# Patient Record
Sex: MALE | Race: WHITE | Employment: FULL TIME | URBAN - METROPOLITAN AREA
[De-identification: names, ages, dates, MRNs, and addresses within clinical notes are randomized per-mention and may not be internally consistent; named-entity substitution may affect disease eponyms.]

---

## 2019-12-17 ENCOUNTER — HOSPITAL ENCOUNTER (OUTPATIENT)
Age: 34
Discharge: HOME | End: 2019-12-19
Payer: COMMERCIAL

## 2019-12-17 ENCOUNTER — OFFICE VISIT (OUTPATIENT)
Dept: FAMILY MEDICINE CLINIC | Age: 34
End: 2019-12-17
Payer: COMMERCIAL

## 2019-12-17 VITALS
OXYGEN SATURATION: 97 % | SYSTOLIC BLOOD PRESSURE: 124 MMHG | HEART RATE: 110 BPM | BODY MASS INDEX: 30.48 KG/M2 | WEIGHT: 225 LBS | HEIGHT: 72 IN | DIASTOLIC BLOOD PRESSURE: 80 MMHG | RESPIRATION RATE: 18 BRPM

## 2019-12-17 DIAGNOSIS — Z22.322 METHICILLIN RESISTANT STAPHYLOCOCCUS AUREUS COLONIZATION: ICD-10-CM

## 2019-12-17 DIAGNOSIS — L01.00 IMPETIGO: Primary | ICD-10-CM

## 2019-12-17 DIAGNOSIS — I10 BENIGN ESSENTIAL HTN: ICD-10-CM

## 2019-12-17 DIAGNOSIS — L02.92 BOILS: ICD-10-CM

## 2019-12-17 DIAGNOSIS — E03.9 ACQUIRED HYPOTHYROIDISM: ICD-10-CM

## 2019-12-17 DIAGNOSIS — E78.2 MIXED HYPERLIPIDEMIA: ICD-10-CM

## 2019-12-17 DIAGNOSIS — J30.2 SEASONAL ALLERGIC RHINITIS, UNSPECIFIED TRIGGER: ICD-10-CM

## 2019-12-17 DIAGNOSIS — R73.01 IFG (IMPAIRED FASTING GLUCOSE): ICD-10-CM

## 2019-12-17 DIAGNOSIS — B07.9 VIRAL WARTS, UNSPECIFIED TYPE: ICD-10-CM

## 2019-12-17 DIAGNOSIS — L01.00 IMPETIGO: ICD-10-CM

## 2019-12-17 LAB
ALBUMIN SERPL-MCNC: 5.1 G/DL (ref 3.5–5.2)
ALP BLD-CCNC: 54 U/L (ref 40–129)
ALT SERPL-CCNC: 15 U/L (ref 0–40)
ANION GAP SERPL CALCULATED.3IONS-SCNC: 18 MMOL/L (ref 7–16)
AST SERPL-CCNC: 23 U/L (ref 0–39)
BASOPHILS ABSOLUTE: 0.06 E9/L (ref 0–0.2)
BASOPHILS RELATIVE PERCENT: 0.8 % (ref 0–2)
BILIRUB SERPL-MCNC: 0.4 MG/DL (ref 0–1.2)
BUN BLDV-MCNC: 7 MG/DL (ref 6–20)
CALCIUM SERPL-MCNC: 10.1 MG/DL (ref 8.6–10.2)
CHLORIDE BLD-SCNC: 104 MMOL/L (ref 98–107)
CHOLESTEROL, TOTAL: 197 MG/DL (ref 0–199)
CO2: 22 MMOL/L (ref 22–29)
CREAT SERPL-MCNC: 0.8 MG/DL (ref 0.7–1.2)
EOSINOPHILS ABSOLUTE: 0.19 E9/L (ref 0.05–0.5)
EOSINOPHILS RELATIVE PERCENT: 2.5 % (ref 0–6)
GFR AFRICAN AMERICAN: >60
GFR NON-AFRICAN AMERICAN: >60 ML/MIN/1.73
GLUCOSE BLD-MCNC: 107 MG/DL (ref 74–99)
HBA1C MFR BLD: 5.4 % (ref 4–5.6)
HCT VFR BLD CALC: 46.3 % (ref 37–54)
HDLC SERPL-MCNC: 54 MG/DL
HEMOGLOBIN: 14.8 G/DL (ref 12.5–16.5)
IMMATURE GRANULOCYTES #: 0.03 E9/L
IMMATURE GRANULOCYTES %: 0.4 % (ref 0–5)
LDL CHOLESTEROL CALCULATED: 119 MG/DL (ref 0–99)
LYMPHOCYTES ABSOLUTE: 1.79 E9/L (ref 1.5–4)
LYMPHOCYTES RELATIVE PERCENT: 23.6 % (ref 20–42)
MCH RBC QN AUTO: 28.4 PG (ref 26–35)
MCHC RBC AUTO-ENTMCNC: 32 % (ref 32–34.5)
MCV RBC AUTO: 88.9 FL (ref 80–99.9)
MONOCYTES ABSOLUTE: 0.68 E9/L (ref 0.1–0.95)
MONOCYTES RELATIVE PERCENT: 9 % (ref 2–12)
NEUTROPHILS ABSOLUTE: 4.83 E9/L (ref 1.8–7.3)
NEUTROPHILS RELATIVE PERCENT: 63.7 % (ref 43–80)
PDW BLD-RTO: 13.2 FL (ref 11.5–15)
PLATELET # BLD: 219 E9/L (ref 130–450)
PMV BLD AUTO: 10.5 FL (ref 7–12)
POTASSIUM SERPL-SCNC: 4.5 MMOL/L (ref 3.5–5)
RBC # BLD: 5.21 E12/L (ref 3.8–5.8)
SODIUM BLD-SCNC: 144 MMOL/L (ref 132–146)
T4 FREE: 1.44 NG/DL (ref 0.93–1.7)
TOTAL PROTEIN: 8.1 G/DL (ref 6.4–8.3)
TRIGL SERPL-MCNC: 119 MG/DL (ref 0–149)
TSH SERPL DL<=0.05 MIU/L-ACNC: 1.46 UIU/ML (ref 0.27–4.2)
VLDLC SERPL CALC-MCNC: 24 MG/DL
WBC # BLD: 7.6 E9/L (ref 4.5–11.5)

## 2019-12-17 PROCEDURE — G8484 FLU IMMUNIZE NO ADMIN: HCPCS | Performed by: FAMILY MEDICINE

## 2019-12-17 PROCEDURE — G8417 CALC BMI ABV UP PARAM F/U: HCPCS | Performed by: FAMILY MEDICINE

## 2019-12-17 PROCEDURE — 4004F PT TOBACCO SCREEN RCVD TLK: CPT | Performed by: FAMILY MEDICINE

## 2019-12-17 PROCEDURE — 84443 ASSAY THYROID STIM HORMONE: CPT

## 2019-12-17 PROCEDURE — 84439 ASSAY OF FREE THYROXINE: CPT

## 2019-12-17 PROCEDURE — 36415 COLL VENOUS BLD VENIPUNCTURE: CPT

## 2019-12-17 PROCEDURE — G8427 DOCREV CUR MEDS BY ELIG CLIN: HCPCS | Performed by: FAMILY MEDICINE

## 2019-12-17 PROCEDURE — 80061 LIPID PANEL: CPT

## 2019-12-17 PROCEDURE — 85025 COMPLETE CBC W/AUTO DIFF WBC: CPT

## 2019-12-17 PROCEDURE — 99203 OFFICE O/P NEW LOW 30 MIN: CPT | Performed by: FAMILY MEDICINE

## 2019-12-17 PROCEDURE — 80053 COMPREHEN METABOLIC PANEL: CPT

## 2019-12-17 PROCEDURE — 83036 HEMOGLOBIN GLYCOSYLATED A1C: CPT

## 2019-12-17 RX ORDER — DOXYCYCLINE HYCLATE 100 MG
100 TABLET ORAL 2 TIMES DAILY WITH MEALS
Qty: 42 TABLET | Refills: 0 | Status: SHIPPED | OUTPATIENT
Start: 2019-12-17 | End: 2020-01-07

## 2019-12-17 RX ORDER — LISINOPRIL 10 MG/1
10 TABLET ORAL DAILY
Qty: 90 TABLET | Refills: 5 | Status: SHIPPED
Start: 2019-12-17 | End: 2021-02-08 | Stop reason: SDUPTHER

## 2019-12-17 RX ORDER — TERAZOSIN 2 MG/1
2 CAPSULE ORAL NIGHTLY
COMMUNITY
End: 2019-12-17 | Stop reason: SDUPTHER

## 2019-12-17 RX ORDER — LEVOTHYROXINE SODIUM 0.05 MG/1
50 TABLET ORAL DAILY
COMMUNITY
End: 2019-12-17 | Stop reason: SDUPTHER

## 2019-12-17 RX ORDER — LEVOTHYROXINE SODIUM 0.05 MG/1
50 TABLET ORAL DAILY
Qty: 90 TABLET | Refills: 5 | Status: SHIPPED
Start: 2019-12-17 | End: 2021-02-16 | Stop reason: SDUPTHER

## 2019-12-17 RX ORDER — IPRATROPIUM BROMIDE 42 UG/1
2 SPRAY, METERED NASAL 3 TIMES DAILY
Qty: 1 BOTTLE | Refills: 5 | Status: SHIPPED
Start: 2019-12-17 | End: 2020-06-07 | Stop reason: ALTCHOICE

## 2019-12-17 RX ORDER — MONTELUKAST SODIUM 10 MG/1
10 TABLET ORAL DAILY
Qty: 30 TABLET | Refills: 5 | Status: SHIPPED
Start: 2019-12-17 | End: 2021-02-16 | Stop reason: SDUPTHER

## 2019-12-17 RX ORDER — TERAZOSIN 2 MG/1
2 CAPSULE ORAL NIGHTLY
Qty: 90 CAPSULE | Refills: 5 | Status: SHIPPED
Start: 2019-12-17 | End: 2020-03-12

## 2019-12-17 ASSESSMENT — PATIENT HEALTH QUESTIONNAIRE - PHQ9
1. LITTLE INTEREST OR PLEASURE IN DOING THINGS: 0
SUM OF ALL RESPONSES TO PHQ QUESTIONS 1-9: 0
SUM OF ALL RESPONSES TO PHQ9 QUESTIONS 1 & 2: 0
2. FEELING DOWN, DEPRESSED OR HOPELESS: 0
SUM OF ALL RESPONSES TO PHQ QUESTIONS 1-9: 0

## 2019-12-18 ASSESSMENT — ENCOUNTER SYMPTOMS
SINUS PAIN: 0
SHORTNESS OF BREATH: 0
APNEA: 0
EYE DISCHARGE: 0
CHEST TIGHTNESS: 0
TROUBLE SWALLOWING: 0
VOMITING: 0
SINUS PRESSURE: 0
EYE ITCHING: 0
WHEEZING: 0
PHOTOPHOBIA: 0
RHINORRHEA: 1
NAUSEA: 0
DIARRHEA: 0
SINUS COMPLAINT: 1
ABDOMINAL DISTENTION: 0
CHOKING: 0
CONSTIPATION: 0
VOICE CHANGE: 0
ANAL BLEEDING: 0
COUGH: 0
BLOOD IN STOOL: 0
EYE PAIN: 0
COLOR CHANGE: 0
RECTAL PAIN: 0
EYE REDNESS: 0
FACIAL SWELLING: 0
STRIDOR: 0
SORE THROAT: 0
ABDOMINAL PAIN: 0
BACK PAIN: 0

## 2020-03-12 ENCOUNTER — HOSPITAL ENCOUNTER (OUTPATIENT)
Age: 35
Discharge: HOME | End: 2020-03-14
Payer: COMMERCIAL

## 2020-03-12 ENCOUNTER — OFFICE VISIT (OUTPATIENT)
Dept: FAMILY MEDICINE CLINIC | Age: 35
End: 2020-03-12
Payer: COMMERCIAL

## 2020-03-12 VITALS
DIASTOLIC BLOOD PRESSURE: 74 MMHG | RESPIRATION RATE: 20 BRPM | BODY MASS INDEX: 32.21 KG/M2 | HEART RATE: 80 BPM | TEMPERATURE: 98.4 F | WEIGHT: 225 LBS | SYSTOLIC BLOOD PRESSURE: 124 MMHG | OXYGEN SATURATION: 98 % | HEIGHT: 70 IN

## 2020-03-12 LAB — RHEUMATOID FACTOR: 17 IU/ML (ref 0–13)

## 2020-03-12 PROCEDURE — G8417 CALC BMI ABV UP PARAM F/U: HCPCS | Performed by: FAMILY MEDICINE

## 2020-03-12 PROCEDURE — 4004F PT TOBACCO SCREEN RCVD TLK: CPT | Performed by: FAMILY MEDICINE

## 2020-03-12 PROCEDURE — 86431 RHEUMATOID FACTOR QUANT: CPT

## 2020-03-12 PROCEDURE — 36415 COLL VENOUS BLD VENIPUNCTURE: CPT

## 2020-03-12 PROCEDURE — 84403 ASSAY OF TOTAL TESTOSTERONE: CPT

## 2020-03-12 PROCEDURE — G8427 DOCREV CUR MEDS BY ELIG CLIN: HCPCS | Performed by: FAMILY MEDICINE

## 2020-03-12 PROCEDURE — 99213 OFFICE O/P EST LOW 20 MIN: CPT | Performed by: FAMILY MEDICINE

## 2020-03-12 PROCEDURE — 86200 CCP ANTIBODY: CPT

## 2020-03-12 PROCEDURE — G8484 FLU IMMUNIZE NO ADMIN: HCPCS | Performed by: FAMILY MEDICINE

## 2020-03-12 RX ORDER — SILDENAFIL CITRATE 20 MG/1
20 TABLET ORAL PRN
Qty: 30 TABLET | Refills: 5 | Status: SHIPPED
Start: 2020-03-12 | End: 2021-02-08 | Stop reason: SDUPTHER

## 2020-03-12 RX ORDER — SULFAMETHOXAZOLE AND TRIMETHOPRIM 800; 160 MG/1; MG/1
TABLET ORAL
COMMUNITY
Start: 2020-03-11 | End: 2020-06-07 | Stop reason: ALTCHOICE

## 2020-03-12 RX ORDER — ASPIRIN 81 MG
5 TABLET, DELAYED RELEASE (ENTERIC COATED) ORAL 4 TIMES DAILY PRN
Qty: 1 BOTTLE | Refills: 0 | Status: SHIPPED
Start: 2020-03-12 | End: 2020-06-07 | Stop reason: ALTCHOICE

## 2020-03-12 ASSESSMENT — PATIENT HEALTH QUESTIONNAIRE - PHQ9
2. FEELING DOWN, DEPRESSED OR HOPELESS: 0
SUM OF ALL RESPONSES TO PHQ9 QUESTIONS 1 & 2: 0
SUM OF ALL RESPONSES TO PHQ QUESTIONS 1-9: 0
1. LITTLE INTEREST OR PLEASURE IN DOING THINGS: 0
SUM OF ALL RESPONSES TO PHQ QUESTIONS 1-9: 0

## 2020-03-13 ENCOUNTER — TELEPHONE (OUTPATIENT)
Dept: FAMILY MEDICINE CLINIC | Age: 35
End: 2020-03-13

## 2020-03-13 LAB — TESTOSTERONE TOTAL: 342.6 NG/DL

## 2020-03-13 ASSESSMENT — ENCOUNTER SYMPTOMS
COLOR CHANGE: 0
ABDOMINAL DISTENTION: 0
BACK PAIN: 0
SORE THROAT: 0
DIARRHEA: 0
APNEA: 0
RHINORRHEA: 0
SINUS PRESSURE: 0
VOMITING: 0
SHORTNESS OF BREATH: 0
EYE ITCHING: 0
CHEST TIGHTNESS: 0
VOICE CHANGE: 0
FACIAL SWELLING: 0
COUGH: 0
EYE PAIN: 0
BLOOD IN STOOL: 0
STRIDOR: 0
WHEEZING: 0
EYE REDNESS: 0
EYE DISCHARGE: 0
ANAL BLEEDING: 0
TROUBLE SWALLOWING: 0
NAUSEA: 0
PHOTOPHOBIA: 0
CHOKING: 0
ABDOMINAL PAIN: 0
CONSTIPATION: 0
RECTAL PAIN: 0

## 2020-03-13 NOTE — PROGRESS NOTES
Ottoniel Olvera is a 30 y.o. male  . Subjective:      Otalgia    There is pain in both (ear fullness and muffled hearing ) ears. This is a recurrent problem. The current episode started more than 1 month ago. The problem occurs constantly. The problem has been waxing and waning. There has been no fever. The patient is experiencing no pain. Pertinent negatives include no abdominal pain, coughing, diarrhea, ear discharge, headaches, hearing loss, neck pain, rash, rhinorrhea, sore throat or vomiting. He has tried nothing for the symptoms. The treatment provided no relief. His past medical history is significant for hearing loss. Hand Pain    The incident occurred more than 1 week ago (bilateral hand pain, stiffiness in the AM). The injury mechanism was repetitive motion. The pain is present in the left hand and right hand. The quality of the pain is described as aching and cramping. The pain does not radiate. The pain is at a severity of 4/10. The pain is moderate. The pain has been intermittent since the incident. Pertinent negatives include no chest pain or numbness. Nothing aggravates the symptoms. He has tried nothing for the symptoms. The treatment provided no relief. Erectile Dysfunction   This is a recurrent problem. The current episode started more than 1 year ago. The problem has been waxing and waning since onset. The nature of his difficulty is maintaining erection. He reports no anxiety. He reports his erection duration to be 1 to 5 minutes. Irritative symptoms include frequency and urgency. Irritative symptoms do not include nocturia. Obstructive symptoms include incomplete emptying, a slower stream, straining and a weak stream. Pertinent negatives include no chills, dysuria or hematuria. Nothing aggravates the symptoms. Treatments tried: hytrin. The treatment provided no relief.        Review of Systems   Constitutional: Negative for activity change, appetite change, chills, diaphoresis, fatigue, fever 10 MG tablet Take 1 tablet by mouth daily 90 tablet 5    montelukast (SINGULAIR) 10 MG tablet Take 1 tablet by mouth daily 30 tablet 5    ipratropium (ATROVENT) 0.06 % nasal spray 2 sprays by Nasal route 3 times daily 1 Bottle 5     No current facility-administered medications on file prior to visit. Allergies   Allergen Reactions    Ativan [Lorazepam] Other (See Comments)     confusion       I have reviewed his allergies, medications, problem list, medical,social and family history and have updated as needed in the electronic medical record. Objective:     Physical Exam  Vitals signs and nursing note reviewed. Constitutional:       General: He is not in acute distress. Appearance: He is well-developed. He is not diaphoretic. HENT:      Head: Normocephalic and atraumatic. Right Ear: External ear normal. There is impacted cerumen. Left Ear: External ear normal. There is impacted cerumen. Nose: Nose normal.      Mouth/Throat:      Pharynx: No oropharyngeal exudate. Eyes:      General: No scleral icterus. Right eye: No discharge. Left eye: No discharge. Conjunctiva/sclera: Conjunctivae normal.      Pupils: Pupils are equal, round, and reactive to light. Neck:      Musculoskeletal: Normal range of motion and neck supple. Thyroid: No thyromegaly. Vascular: No JVD. Trachea: No tracheal deviation. Cardiovascular:      Rate and Rhythm: Normal rate and regular rhythm. Heart sounds: Normal heart sounds. No murmur. No friction rub. No gallop. Pulmonary:      Effort: Pulmonary effort is normal. No respiratory distress. Breath sounds: Normal breath sounds. No stridor. No wheezing or rales. Chest:      Chest wall: No tenderness. Abdominal:      General: Bowel sounds are normal. There is no distension. Palpations: Abdomen is soft. There is no mass. Tenderness: There is no abdominal tenderness. There is no guarding or rebound.

## 2020-03-15 LAB — CCP IGG ANTIBODIES: 15 UNITS (ref 0–19)

## 2020-04-28 ENCOUNTER — TELEPHONE (OUTPATIENT)
Dept: ENT CLINIC | Age: 35
End: 2020-04-28

## 2020-06-07 ENCOUNTER — HOSPITAL ENCOUNTER (EMERGENCY)
Age: 35
Discharge: HOME | End: 2020-06-07
Attending: EMERGENCY MEDICINE
Payer: COMMERCIAL

## 2020-06-07 ENCOUNTER — APPOINTMENT (OUTPATIENT)
Dept: GENERAL RADIOLOGY | Age: 35
End: 2020-06-07
Payer: COMMERCIAL

## 2020-06-07 VITALS
TEMPERATURE: 98.1 F | OXYGEN SATURATION: 97 % | SYSTOLIC BLOOD PRESSURE: 136 MMHG | RESPIRATION RATE: 16 BRPM | HEIGHT: 72 IN | DIASTOLIC BLOOD PRESSURE: 72 MMHG | BODY MASS INDEX: 29.12 KG/M2 | WEIGHT: 215 LBS | HEART RATE: 90 BPM

## 2020-06-07 PROCEDURE — 73110 X-RAY EXAM OF WRIST: CPT

## 2020-06-07 PROCEDURE — 99283 EMERGENCY DEPT VISIT LOW MDM: CPT

## 2020-06-07 PROCEDURE — 6370000000 HC RX 637 (ALT 250 FOR IP): Performed by: EMERGENCY MEDICINE

## 2020-06-07 RX ORDER — IBUPROFEN 600 MG/1
600 TABLET ORAL ONCE
Status: COMPLETED | OUTPATIENT
Start: 2020-06-07 | End: 2020-06-07

## 2020-06-07 RX ORDER — IBUPROFEN 800 MG/1
800 TABLET ORAL EVERY 8 HOURS PRN
Qty: 15 TABLET | Refills: 0 | Status: SHIPPED | OUTPATIENT
Start: 2020-06-07 | End: 2021-05-04 | Stop reason: ALTCHOICE

## 2020-06-07 RX ADMIN — IBUPROFEN 600 MG: 600 TABLET, FILM COATED ORAL at 21:27

## 2020-06-07 ASSESSMENT — PAIN DESCRIPTION - LOCATION: LOCATION: WRIST

## 2020-06-07 ASSESSMENT — PAIN DESCRIPTION - PAIN TYPE: TYPE: ACUTE PAIN

## 2020-06-07 ASSESSMENT — PAIN SCALES - GENERAL: PAINLEVEL_OUTOF10: 5

## 2020-06-07 ASSESSMENT — PAIN DESCRIPTION - DESCRIPTORS: DESCRIPTORS: SHARP;SORE

## 2020-06-08 NOTE — ED NOTES
velcro wrist splint applied to right wrist, CMP's intact before and after application      Yang D eSantiago RN  06/07/20 [id number]

## 2020-07-01 ENCOUNTER — TELEPHONE (OUTPATIENT)
Dept: ADMINISTRATIVE | Age: 35
End: 2020-07-01
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
## 2020-07-02 NOTE — TELEPHONE ENCOUNTER
Dr. Malik Aguilar: could you please ask your MA to handle this situation? I already spoke with the patient last night and he told me that he didn't feel that he needed to go to the ED again. There is nothing more that I can say to him being a  that would make him go to the ED, but your MA/office might be able to be persuade him to do so.    Thank you

## 2021-01-25 DIAGNOSIS — I10 BENIGN ESSENTIAL HTN: ICD-10-CM

## 2021-01-25 DIAGNOSIS — N52.9 ERECTILE DYSFUNCTION, UNSPECIFIED ERECTILE DYSFUNCTION TYPE: ICD-10-CM

## 2021-01-26 ENCOUNTER — TELEPHONE (OUTPATIENT)
Dept: ADMINISTRATIVE | Age: 36
End: 2021-01-26

## 2021-01-26 RX ORDER — LISINOPRIL 10 MG/1
TABLET ORAL
Qty: 30 TABLET | Refills: 15 | OUTPATIENT
Start: 2021-01-26

## 2021-01-26 RX ORDER — SILDENAFIL CITRATE 20 MG/1
20 TABLET ORAL PRN
Qty: 30 TABLET | Refills: 5 | OUTPATIENT
Start: 2021-01-26

## 2021-01-26 NOTE — TELEPHONE ENCOUNTER
Pt is needing a OV for med refills and ckup. Not seeing any open slots. Please contact and advise. Thank you in advance.

## 2021-02-08 ENCOUNTER — TELEPHONE (OUTPATIENT)
Dept: FAMILY MEDICINE CLINIC | Age: 36
End: 2021-02-08

## 2021-02-08 DIAGNOSIS — I10 BENIGN ESSENTIAL HTN: ICD-10-CM

## 2021-02-08 DIAGNOSIS — N52.9 ERECTILE DYSFUNCTION, UNSPECIFIED ERECTILE DYSFUNCTION TYPE: ICD-10-CM

## 2021-02-08 NOTE — TELEPHONE ENCOUNTER
Pt has o/v schedule needs need bp medicine until appt.     Last seen 6/17/2020  Next appt 2/16/2021    [facility] [address]

## 2021-02-10 RX ORDER — SILDENAFIL CITRATE 20 MG/1
20 TABLET ORAL PRN
Qty: 30 TABLET | Refills: 5 | Status: SHIPPED
Start: 2021-02-10 | End: 2021-02-16 | Stop reason: SDUPTHER

## 2021-02-10 RX ORDER — LISINOPRIL 10 MG/1
10 TABLET ORAL DAILY
Qty: 30 TABLET | Refills: 5 | Status: SHIPPED
Start: 2021-02-10 | End: 2021-02-16 | Stop reason: SDUPTHER

## 2021-02-16 ENCOUNTER — VIRTUAL VISIT (OUTPATIENT)
Dept: FAMILY MEDICINE CLINIC | Age: 36
End: 2021-02-16

## 2021-02-16 DIAGNOSIS — I10 BENIGN ESSENTIAL HTN: Primary | ICD-10-CM

## 2021-02-16 DIAGNOSIS — E03.9 ACQUIRED HYPOTHYROIDISM: ICD-10-CM

## 2021-02-16 DIAGNOSIS — J30.2 SEASONAL ALLERGIC RHINITIS, UNSPECIFIED TRIGGER: ICD-10-CM

## 2021-02-16 DIAGNOSIS — E78.2 MIXED HYPERLIPIDEMIA: ICD-10-CM

## 2021-02-16 DIAGNOSIS — N52.9 ERECTILE DYSFUNCTION, UNSPECIFIED ERECTILE DYSFUNCTION TYPE: ICD-10-CM

## 2021-02-16 DIAGNOSIS — I10 ESSENTIAL HYPERTENSION: ICD-10-CM

## 2021-02-16 RX ORDER — LISINOPRIL 10 MG/1
10 TABLET ORAL DAILY
Qty: 30 TABLET | Refills: 5 | Status: SHIPPED
Start: 2021-02-16 | End: 2022-09-14 | Stop reason: SDUPTHER

## 2021-02-16 RX ORDER — LEVOTHYROXINE SODIUM 0.05 MG/1
50 TABLET ORAL DAILY
Qty: 90 TABLET | Refills: 5 | Status: SHIPPED
Start: 2021-02-16 | End: 2021-08-16 | Stop reason: ALTCHOICE

## 2021-02-16 RX ORDER — SILDENAFIL CITRATE 20 MG/1
20 TABLET ORAL PRN
Qty: 30 TABLET | Refills: 5 | Status: SHIPPED
Start: 2021-02-16 | End: 2022-09-14 | Stop reason: SDUPTHER

## 2021-02-16 RX ORDER — MONTELUKAST SODIUM 10 MG/1
10 TABLET ORAL DAILY
Qty: 30 TABLET | Refills: 5 | Status: SHIPPED
Start: 2021-02-16 | End: 2022-10-21 | Stop reason: ALTCHOICE

## 2021-02-16 ASSESSMENT — ENCOUNTER SYMPTOMS
STRIDOR: 0
SORE THROAT: 0
SINUS PRESSURE: 0
COLOR CHANGE: 0
EYE PAIN: 0
CHEST TIGHTNESS: 0
NAUSEA: 0
RECTAL PAIN: 0
CONSTIPATION: 0
DIARRHEA: 0
APNEA: 0
RHINORRHEA: 0
ABDOMINAL DISTENTION: 0
BLOOD IN STOOL: 0
EYE REDNESS: 0
FACIAL SWELLING: 0
EYE ITCHING: 0
CHOKING: 0
ABDOMINAL PAIN: 0
WHEEZING: 0
ANAL BLEEDING: 0
COUGH: 0
PHOTOPHOBIA: 0
TROUBLE SWALLOWING: 0
SHORTNESS OF BREATH: 0
BACK PAIN: 0
VOMITING: 0
VOICE CHANGE: 0
EYE DISCHARGE: 0

## 2021-02-16 NOTE — PROGRESS NOTES
TeleMedicine Video Visit    This visit was performed as a virtual video visit using a synchronous, two-way, audio-video telehealth technology platform. Patient identification was verified at the start of the visit, including the patient's telephone number and physical location. I discussed with the patient the nature of our telehealth visits, that:     1. Due to the nature of an audio- video modality, the only components of a physical exam that could be done are the elements supported by direct observation. 2. I would evaluate the patient and recommend diagnostics and treatments based on my assessment. 3. If it was felt that the patient should be evaluated in clinic or an emergency room setting, then they would be directed there. 4. Our sessions are not being recorded and that personal health information is protected. 5. Our team would provide follow up care in person if/when the patient needs it. Patient does agree to proceed with telemedicine consultation. Patient's location: home address in [facility]  Physician  location other address in [facility] other people involved in call, patient only      Time spent: Greater than 15    This visit was completed virtually using Doxy. sarai Cheng is a 37 y.o. male  . Subjective:      Hypertension  This is a chronic problem. The current episode started more than 1 year ago. The problem has been waxing and waning since onset. The problem is resistant. Pertinent negatives include no chest pain, headaches, neck pain, palpitations or shortness of breath. Past treatments include ACE inhibitors. The current treatment provides moderate improvement. There are no compliance problems.     Fatigue This is a chronic problem. The current episode started more than 1 year ago. The problem occurs intermittently. The problem has been waxing and waning. Associated symptoms include fatigue. Pertinent negatives include no abdominal pain, arthralgias, chest pain, chills, congestion, coughing, diaphoresis, fever, headaches, joint swelling, myalgias, nausea, neck pain, numbness, rash, sore throat, vomiting or weakness. Exacerbated by: hypothyroidism. Treatments tried: thyroid treatment. The treatment provided moderate relief. Erectile Dysfunction  This is a recurrent problem. The current episode started more than 1 year ago. The problem has been waxing and waning since onset. The nature of his difficulty is maintaining erection. He reports no anxiety. He reports his erection duration to be 1 to 5 minutes. Irritative symptoms do not include frequency, nocturia or urgency. Obstructive symptoms include incomplete emptying, a slower stream, straining and a weak stream. Pertinent negatives include no chills, dysuria or hematuria. Nothing aggravates the symptoms. Past treatments include sildenafil. The treatment provided moderate relief. Review of Systems   Constitutional: Positive for fatigue. Negative for activity change, appetite change, chills, diaphoresis, fever and unexpected weight change. HENT: Negative for congestion, dental problem, drooling, ear discharge, ear pain, facial swelling, hearing loss, mouth sores, nosebleeds, postnasal drip, rhinorrhea, sinus pressure, sneezing, sore throat, tinnitus, trouble swallowing and voice change. Eyes: Negative for photophobia, pain, discharge, redness, itching and visual disturbance. Respiratory: Negative for apnea, cough, choking, chest tightness, shortness of breath, wheezing and stridor. Cardiovascular: Negative for chest pain, palpitations and leg swelling. Gastrointestinal: Negative for abdominal distention, abdominal pain, anal bleeding, blood in stool, constipation, diarrhea, nausea, rectal pain and vomiting. Endocrine: Negative for cold intolerance, heat intolerance, polydipsia, polyphagia and polyuria. Genitourinary: Positive for incomplete emptying. Negative for decreased urine volume, difficulty urinating, discharge, dysuria, enuresis, flank pain, frequency, genital sores, hematuria, nocturia, penile pain, penile swelling, scrotal swelling, testicular pain and urgency. ED   Musculoskeletal: Negative for arthralgias, back pain, gait problem, joint swelling, myalgias, neck pain and neck stiffness. Skin: Negative for color change, pallor, rash and wound. Allergic/Immunologic: Negative for environmental allergies, food allergies and immunocompromised state. Neurological: Negative for dizziness, tremors, seizures, syncope, facial asymmetry, speech difficulty, weakness, light-headedness, numbness and headaches. Hematological: Negative for adenopathy. Does not bruise/bleed easily. Psychiatric/Behavioral: Negative for agitation, behavioral problems, confusion, decreased concentration, dysphoric mood, hallucinations, self-injury, sleep disturbance and suicidal ideas. The patient is not nervous/anxious and is not hyperactive.         Past Medical History:   Diagnosis Date    Anxiety     Asthma     as a child    Hypertension     PTSD (post-traumatic stress disorder)        Social History     Socioeconomic History    Marital status:      Spouse name: Not on file    Number of children: Not on file    Years of education: Not on file    Highest education level: Not on file   Occupational History    Not on file   Social Needs    Financial resource strain: Not on file    Food insecurity     Worry: Not on file     Inability: Not on file    Transportation needs     Medical: Not on file     Non-medical: Not on file   Tobacco Use  Smoking status: Current Every Day Smoker     Packs/day: 1.00     Years: 15.00     Pack years: 15.00    Smokeless tobacco: Never Used   Substance and Sexual Activity    Alcohol use: No     Comment: none for past month    Drug use: No    Sexual activity: Not on file   Lifestyle    Physical activity     Days per week: Not on file     Minutes per session: Not on file    Stress: Not on file   Relationships    Social connections     Talks on phone: Not on file     Gets together: Not on file     Attends [employer] service: Not on file     Active member of club or organization: Not on file     Attends meetings of clubs or organizations: Not on file     Relationship status: Not on file    Intimate partner violence     Fear of current or ex partner: Not on file     Emotionally abused: Not on file     Physically abused: Not on file     Forced sexual activity: Not on file   Other Topics Concern    Not on file   Social History Narrative    Not on file       Family History   Problem Relation Age of Onset    High Blood Pressure Father        Current Outpatient Medications on File Prior to Visit   Medication Sig Dispense Refill    ibuprofen (ADVIL;MOTRIN) 800 MG tablet Take 1 tablet by mouth every 8 hours as needed for Pain 15 tablet 0     No current facility-administered medications on file prior to visit. Allergies   Allergen Reactions    Ativan [Lorazepam] Other (See Comments)     confusion       I have reviewed his allergies, medications, problem list, medical,social and family history and have updated as needed in the electronic medical record. Objective:     Physical Exam  Constitutional:       General: He is not in acute distress. Appearance: Normal appearance. He is normal weight. He is not ill-appearing, toxic-appearing or diaphoretic. HENT:      Head: Normocephalic and atraumatic.    Pulmonary:      Comments: No respiratory distress  Genitourinary:     Comments: Deferred by patient  Skin: Comments: No rash, no lesion   Neurological:      General: No focal deficit present. Mental Status: He is alert and oriented to person, place, and time. Psychiatric:         Mood and Affect: Mood normal.         Behavior: Behavior normal.         Thought Content: Thought content normal.         Judgment: Judgment normal.         Assessment / Plan:   Mago Rivera was seen today for hypertension, fatigue and hypothyroidism. Diagnoses and all orders for this visit:    Benign essential HTN  -     CBC Auto Differential; Future  -     Comprehensive Metabolic Panel; Future  -     TSH without Reflex; Future  -     Hemoglobin A1C; Future  -     Lipid Panel; Future  -     lisinopril (PRINIVIL;ZESTRIL) 10 MG tablet; Take 1 tablet by mouth daily    Mixed hyperlipidemia  -     CBC Auto Differential; Future  -     Comprehensive Metabolic Panel; Future  -     TSH without Reflex; Future  -     Hemoglobin A1C; Future  -     Lipid Panel; Future    Essential hypertension  -     CBC Auto Differential; Future  -     Comprehensive Metabolic Panel; Future  -     TSH without Reflex; Future  -     Hemoglobin A1C; Future  -     Lipid Panel; Future    Acquired hypothyroidism  -     levothyroxine (SYNTHROID) 50 MCG tablet; Take 1 tablet by mouth Daily    Erectile dysfunction, unspecified erectile dysfunction type  -     sildenafil (REVATIO) 20 MG tablet; Take 1 tablet by mouth as needed (ed)    Seasonal allergic rhinitis, unspecified trigger  -     montelukast (SINGULAIR) 10 MG tablet; Take 1 tablet by mouth daily        Reviewed healthmaintenance report. Patient is aware of deficiencies and suggested preventative tests.

## 2021-04-26 ENCOUNTER — APPOINTMENT (OUTPATIENT)
Dept: GENERAL RADIOLOGY | Age: 36
End: 2021-04-26
Payer: COMMERCIAL

## 2021-04-26 ENCOUNTER — HOSPITAL ENCOUNTER (EMERGENCY)
Age: 36
Discharge: HOME | End: 2021-04-27
Attending: EMERGENCY MEDICINE
Payer: COMMERCIAL

## 2021-04-26 VITALS
RESPIRATION RATE: 16 BRPM | BODY MASS INDEX: 30.48 KG/M2 | SYSTOLIC BLOOD PRESSURE: 136 MMHG | WEIGHT: 225 LBS | HEIGHT: 72 IN | OXYGEN SATURATION: 98 % | TEMPERATURE: 97.7 F | DIASTOLIC BLOOD PRESSURE: 84 MMHG | HEART RATE: 90 BPM

## 2021-04-26 DIAGNOSIS — M54.2 NECK PAIN: Primary | ICD-10-CM

## 2021-04-26 PROCEDURE — 99283 EMERGENCY DEPT VISIT LOW MDM: CPT

## 2021-04-26 PROCEDURE — 96372 THER/PROPH/DIAG INJ SC/IM: CPT

## 2021-04-26 PROCEDURE — 73030 X-RAY EXAM OF SHOULDER: CPT

## 2021-04-26 PROCEDURE — 6360000002 HC RX W HCPCS: Performed by: EMERGENCY MEDICINE

## 2021-04-26 PROCEDURE — 72050 X-RAY EXAM NECK SPINE 4/5VWS: CPT

## 2021-04-26 RX ORDER — ORPHENADRINE CITRATE 30 MG/ML
60 INJECTION INTRAMUSCULAR; INTRAVENOUS ONCE
Status: COMPLETED | OUTPATIENT
Start: 2021-04-26 | End: 2021-04-26

## 2021-04-26 RX ORDER — KETOROLAC TROMETHAMINE 30 MG/ML
30 INJECTION, SOLUTION INTRAMUSCULAR; INTRAVENOUS ONCE
Status: COMPLETED | OUTPATIENT
Start: 2021-04-26 | End: 2021-04-26

## 2021-04-26 RX ADMIN — ORPHENADRINE CITRATE 60 MG: 30 INJECTION INTRAMUSCULAR; INTRAVENOUS at 23:48

## 2021-04-26 RX ADMIN — KETOROLAC TROMETHAMINE 30 MG: 30 INJECTION, SOLUTION INTRAMUSCULAR at 23:48

## 2021-04-26 ASSESSMENT — PAIN DESCRIPTION - PAIN TYPE: TYPE: ACUTE PAIN

## 2021-04-26 ASSESSMENT — PAIN SCALES - GENERAL: PAINLEVEL_OUTOF10: 8

## 2021-04-26 ASSESSMENT — PAIN DESCRIPTION - FREQUENCY: FREQUENCY: CONTINUOUS

## 2021-04-26 ASSESSMENT — PAIN DESCRIPTION - ORIENTATION: ORIENTATION: LEFT

## 2021-04-27 RX ORDER — NAPROXEN 375 MG/1
375 TABLET ORAL 2 TIMES DAILY WITH MEALS
Qty: 60 TABLET | Refills: 0 | Status: SHIPPED | OUTPATIENT
Start: 2021-04-27 | End: 2021-08-16 | Stop reason: ALTCHOICE

## 2021-04-27 RX ORDER — CYCLOBENZAPRINE HCL 10 MG
10 TABLET ORAL NIGHTLY PRN
Qty: 10 TABLET | Refills: 0 | Status: SHIPPED | OUTPATIENT
Start: 2021-04-27 | End: 2021-05-07

## 2021-04-27 RX ORDER — LIDOCAINE 50 MG/G
1 PATCH TOPICAL DAILY
Qty: 10 PATCH | Refills: 0 | Status: SHIPPED | OUTPATIENT
Start: 2021-04-27 | End: 2021-05-07

## 2021-04-27 ASSESSMENT — ENCOUNTER SYMPTOMS
SHORTNESS OF BREATH: 0
ABDOMINAL PAIN: 0
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
## 2021-04-27 NOTE — ED NOTES
Discharge instructions and prescriptions given. Patient states verbal understanding. Ambulatory to exit.        Betty Calvert RN  04/27/21 [id number]

## 2021-05-03 ENCOUNTER — APPOINTMENT (OUTPATIENT)
Dept: CT IMAGING | Age: 36
End: 2021-05-03
Payer: COMMERCIAL

## 2021-05-03 ENCOUNTER — HOSPITAL ENCOUNTER (EMERGENCY)
Age: 36
Discharge: HOME | End: 2021-05-04
Attending: EMERGENCY MEDICINE
Payer: COMMERCIAL

## 2021-05-03 VITALS
RESPIRATION RATE: 18 BRPM | DIASTOLIC BLOOD PRESSURE: 88 MMHG | SYSTOLIC BLOOD PRESSURE: 138 MMHG | HEART RATE: 95 BPM | TEMPERATURE: 96.4 F | OXYGEN SATURATION: 99 %

## 2021-05-03 DIAGNOSIS — R20.2 ARM PARESTHESIA, LEFT: ICD-10-CM

## 2021-05-03 DIAGNOSIS — M54.12 CERVICAL RADICULOPATHY: Primary | ICD-10-CM

## 2021-05-03 PROCEDURE — 99284 EMERGENCY DEPT VISIT MOD MDM: CPT

## 2021-05-03 PROCEDURE — 72125 CT NECK SPINE W/O DYE: CPT

## 2021-05-03 PROCEDURE — 6370000000 HC RX 637 (ALT 250 FOR IP): Performed by: EMERGENCY MEDICINE

## 2021-05-03 PROCEDURE — 96372 THER/PROPH/DIAG INJ SC/IM: CPT

## 2021-05-03 PROCEDURE — 70450 CT HEAD/BRAIN W/O DYE: CPT

## 2021-05-03 PROCEDURE — 6360000002 HC RX W HCPCS: Performed by: EMERGENCY MEDICINE

## 2021-05-03 RX ORDER — ORPHENADRINE CITRATE 30 MG/ML
60 INJECTION INTRAMUSCULAR; INTRAVENOUS ONCE
Status: COMPLETED | OUTPATIENT
Start: 2021-05-03 | End: 2021-05-03

## 2021-05-03 RX ORDER — PREDNISONE 20 MG/1
60 TABLET ORAL ONCE
Status: COMPLETED | OUTPATIENT
Start: 2021-05-03 | End: 2021-05-03

## 2021-05-03 RX ADMIN — HYDROMORPHONE HYDROCHLORIDE 1 MG: 1 INJECTION, SOLUTION INTRAMUSCULAR; INTRAVENOUS; SUBCUTANEOUS at 22:59

## 2021-05-03 RX ADMIN — ORPHENADRINE CITRATE 60 MG: 30 INJECTION INTRAMUSCULAR; INTRAVENOUS at 22:59

## 2021-05-03 RX ADMIN — PREDNISONE 60 MG: 20 TABLET ORAL at 22:59

## 2021-05-03 ASSESSMENT — PAIN SCALES - GENERAL
PAINLEVEL_OUTOF10: 9
PAINLEVEL_OUTOF10: 9

## 2021-05-03 ASSESSMENT — PAIN DESCRIPTION - ORIENTATION: ORIENTATION: LEFT

## 2021-05-03 ASSESSMENT — PAIN DESCRIPTION - LOCATION: LOCATION: ARM

## 2021-05-04 RX ORDER — IBUPROFEN 800 MG/1
800 TABLET ORAL EVERY 6 HOURS PRN
Qty: 20 TABLET | Refills: 0 | Status: SHIPPED | OUTPATIENT
Start: 2021-05-04 | End: 2021-10-08

## 2021-05-04 RX ORDER — OXYCODONE HYDROCHLORIDE AND ACETAMINOPHEN 5; 325 MG/1; MG/1
1 TABLET ORAL EVERY 6 HOURS PRN
Qty: 12 TABLET | Refills: 0 | Status: SHIPPED | OUTPATIENT
Start: 2021-05-04 | End: 2021-05-07

## 2021-05-04 RX ORDER — METHYLPREDNISOLONE 4 MG/1
TABLET ORAL
Qty: 1 KIT | Refills: 0 | Status: SHIPPED | OUTPATIENT
Start: 2021-05-04 | End: 2021-05-10

## 2021-05-04 ASSESSMENT — PAIN SCALES - GENERAL: PAINLEVEL_OUTOF10: 1
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
## 2021-05-10 ENCOUNTER — INITIAL CONSULT (OUTPATIENT)
Dept: NEUROSURGERY | Age: 36
End: 2021-05-10
Payer: COMMERCIAL

## 2021-05-10 VITALS
DIASTOLIC BLOOD PRESSURE: 93 MMHG | SYSTOLIC BLOOD PRESSURE: 142 MMHG | HEIGHT: 72 IN | BODY MASS INDEX: 30.48 KG/M2 | HEART RATE: 93 BPM | WEIGHT: 225 LBS | RESPIRATION RATE: 16 BRPM | OXYGEN SATURATION: 99 %

## 2021-05-10 DIAGNOSIS — M54.12 CERVICAL RADICULOPATHY: Primary | ICD-10-CM

## 2021-05-10 PROCEDURE — G8419 CALC BMI OUT NRM PARAM NOF/U: HCPCS | Performed by: PHYSICIAN ASSISTANT

## 2021-05-10 PROCEDURE — G8427 DOCREV CUR MEDS BY ELIG CLIN: HCPCS | Performed by: PHYSICIAN ASSISTANT

## 2021-05-10 PROCEDURE — 4004F PT TOBACCO SCREEN RCVD TLK: CPT | Performed by: PHYSICIAN ASSISTANT

## 2021-05-10 PROCEDURE — 99204 OFFICE O/P NEW MOD 45 MIN: CPT | Performed by: PHYSICIAN ASSISTANT

## 2021-05-10 RX ORDER — GABAPENTIN 300 MG/1
300 CAPSULE ORAL 3 TIMES DAILY
Qty: 90 CAPSULE | Refills: 3 | Status: SHIPPED
Start: 2021-05-10 | End: 2021-11-18 | Stop reason: SDUPTHER

## 2021-05-10 ASSESSMENT — ENCOUNTER SYMPTOMS
RESPIRATORY NEGATIVE: 1
ALLERGIC/IMMUNOLOGIC NEGATIVE: 1
GASTROINTESTINAL NEGATIVE: 1
EYES NEGATIVE: 1

## 2021-05-10 NOTE — PROGRESS NOTES
Subjective:      Patient ID: Rocio Thurston is a 31 y.o. male. Neck Pain   This is a new problem. Episode onset: 3 weeks. The problem occurs constantly. The problem has been gradually worsening. The pain is associated with nothing. Pain location: and left arm pain into the hand. The quality of the pain is described as aching, shooting and stabbing. The pain is at a severity of 8/10. The symptoms are aggravated by twisting, stress and bending. He has tried muscle relaxants and NSAIDs (medrol dose pack, oxycodone.) for the symptoms. The treatment provided mild relief. Review of Systems   Constitutional: Negative. HENT: Negative. Eyes: Negative. Respiratory: Negative. Cardiovascular: Negative. Gastrointestinal: Negative. Endocrine: Negative. Genitourinary: Negative. Musculoskeletal: Positive for neck pain. Skin: Negative. Allergic/Immunologic: Negative. Neurological: Negative. Hematological: Negative. Psychiatric/Behavioral: Negative. Objective:   Physical Exam  Constitutional:       Appearance: Normal appearance. HENT:      Head: Normocephalic and atraumatic. Nose: Nose normal.   Eyes:      Pupils: Pupils are equal, round, and reactive to light. Pulmonary:      Effort: Pulmonary effort is normal.   Abdominal:      General: There is no distension. Skin:     General: Skin is warm and dry. Neurological:      Mental Status: He is alert. GCS: GCS eye subscore is 4. GCS verbal subscore is 5. GCS motor subscore is 6. Cranial Nerves: Cranial nerves are intact. Sensory: Sensory deficit present. Motor: Weakness present. Gait: Gait is intact. Deep Tendon Reflexes: Babinski sign absent on the right side. Babinski sign absent on the left side. Reflex Scores:       Tricep reflexes are 2+ on the right side and 2+ on the left side. Bicep reflexes are 2+ on the right side and 2+ on the left side.        Brachioradialis reflexes are 2+ on the right side and 2+ on the left side. Patellar reflexes are 2+ on the right side and 2+ on the left side. Achilles reflexes are 2+ on the right side and 2+ on the left side. Comments: +hoffmans bilaterally    Decreased sensation to left C7 dist    4/5 right deltoid and bicept   Psychiatric:         Mood and Affect: Mood normal.         Assessment:      31year old male with severe neck and left arm pain x 3 weeks. Cervical CT reveals reversal or normal lordosis. Diffuse spondylosis and degenerative changes evident. History and exam is suggestive of myelopathy. Plan: We will order gabapentin and PT. He may continue with NSAIDS if beneficial.  We will order a cervical MRI once PT is complete.         EMORY Godoy

## 2021-05-14 ENCOUNTER — HOSPITAL ENCOUNTER (OUTPATIENT)
Dept: PHYSICAL THERAPY | Age: 36
Setting detail: THERAPIES SERIES
Discharge: HOME | End: 2021-05-14
Payer: COMMERCIAL

## 2021-05-14 PROCEDURE — 97162 PT EVAL MOD COMPLEX 30 MIN: CPT | Performed by: PHYSICAL THERAPIST

## 2021-05-14 NOTE — PROGRESS NOTES
[facility] OUTPATIENT REHABILITATION  PHYSICAL THERAPY INITIAL EVALUATION         Date:  2021   Patient: Leticia Lui  : 1985  MRN: [id number]  Referring Provider: Mona Concepcion, [address] [place],  [address] [facility]     Medical Diagnosis:  Cerv radiculopathy M54.12   Onset date: 4 weeks ago  Mechanism of Injury: Insidious onset   Chief complaint: Left shoulder pain that can radiates into left hand. Some neck pain. SUBJECTIVE:     Past Medical History  Past Medical History:   Diagnosis Date    Anxiety     Asthma     as a child    Hypertension     PTSD (post-traumatic stress disorder)      Past Surgical History:   Procedure Laterality Date    FRACTURE SURGERY      KNEE SURGERY Left     ACL repair, meniscus tear    OTHER SURGICAL HISTORY Left 10/8/2015    ORIF ulna       Medications:   Current Outpatient Medications   Medication Sig Dispense Refill    gabapentin (NEURONTIN) 300 MG capsule Take 1 capsule by mouth 3 times daily for 30 days. 90 capsule 3    ibuprofen (ADVIL;MOTRIN) 800 MG tablet Take 1 tablet by mouth every 6 hours as needed for Pain 20 tablet 0    naproxen (NAPROSYN) 375 MG tablet Take 1 tablet by mouth 2 times daily (with meals) 60 tablet 0    lisinopril (PRINIVIL;ZESTRIL) 10 MG tablet Take 1 tablet by mouth daily 30 tablet 5    levothyroxine (SYNTHROID) 50 MCG tablet Take 1 tablet by mouth Daily 90 tablet 5    sildenafil (REVATIO) 20 MG tablet Take 1 tablet by mouth as needed (ed) 30 tablet 5    montelukast (SINGULAIR) 10 MG tablet Take 1 tablet by mouth daily 30 tablet 5     No current facility-administered medications for this encounter. Imaging results: Xr Cervical Spine (4-5 Views)    Result Date: 2021  EXAMINATION: XRAY VIEWS OF THE CERVICAL SPINE 2021 11:10 pm COMPARISON: None.  HISTORY: ORDERING SYSTEM PROVIDED HISTORY: left sided neck pain TECHNOLOGIST PROVIDED HISTORY: Reason for exam:->left sided neck pain FINDINGS: Reversal of the cervical lordosis which may reflect muscle spasm or may be positional. Degenerative changes of the cervical spine with narrowing of the C5-6 intervertebral disc space. Oblique films show bilateral neural foraminal narrowing at C5-6 left greater than right. Open-mouth view of the odontoid within normal limits. Accessory articulation between the right 1st and 2nd ribs. Reversal of cervical lordosis which may reflect muscle spasm or may be positional. Narrowing of the C5-6 intervertebral disc space. Bilateral neural foraminal narrowing at C5-6 left greater than right Accessory articulation between the right 1st and 2nd ribs. Ct Head Wo Contrast    Result Date: 5/3/2021  EXAMINATION: CT OF THE HEAD WITHOUT CONTRAST; CT OF THE CERVICAL SPINE WITHOUT CONTRAST 5/3/2021 11:21 pm TECHNIQUE: CT of the head was performed without the administration of intravenous contrast. Dose modulation, iterative reconstruction, and/or weight based adjustment of the mA/kV was utilized to reduce the radiation dose to as low as reasonably achievable.; CT of the cervical spine was performed without the administration of intravenous contrast. Multiplanar reformatted images are provided for review. Dose modulation, iterative reconstruction, and/or weight based adjustment of the mA/kV was utilized to reduce the radiation dose to as low as reasonably achievable. COMPARISON: None. HISTORY: ORDERING SYSTEM PROVIDED HISTORY: L arm numbness TECHNOLOGIST PROVIDED HISTORY: Reason for exam:->L arm numbness Has a \"code stroke\" or \"stroke alert\" been called? ->No Decision Support Exception - unselect if not a suspected or confirmed emergency medical condition->Emergency Medical Condition (MA); ORDERING SYSTEM PROVIDED HISTORY: L neck pain and paresthesia TECHNOLOGIST PROVIDED HISTORY: Reason for exam:->L neck pain and paresthesia Decision Support Exception - unselect if not a suspected or confirmed emergency medical condition->Emergency Medical Condition (MA) FINDINGS: CT CERVICAL SPINE: BONES/ALIGNMENT: No acute fracture. Mild reversal of the normal cervical lordosis is centered at C4-C5. No significant listhesis. The craniocervical junction is intact. No destructive osseous lesion. DEGENERATIVE CHANGES: Multilevel degenerative changes in the cervical spine, centered at C5-C6. Moderate to severe C5-C6 and C6-C7 spinal canal stenosis secondary to disc osteophyte complexes. Moderate to severe bilateral neural foraminal narrowing at C5-C6 and C6-C7 secondary to uncovertebral and facet joint disease. C6-C7 left paracentral protrusion indents the left ventral cord. SOFT TISSUES: There is no prevertebral soft tissue swelling. CT HEAD: BRAIN/VENTRICLES: There is no acute intracranial hemorrhage, mass effect or midline shift. No abnormal extra-axial fluid collection. The gray-white differentiation is maintained without evidence of an acute infarct. There is no evidence of hydrocephalus. ORBITS: The visualized portion of the orbits demonstrate no acute abnormality. SINUSES: The visualized paranasal sinuses and mastoid air cells demonstrate no acute abnormality. SOFT TISSUES/SKULL: No acute abnormality of the visualized skull or soft tissues. 1.  No acute intracranial abnormality. Specifically, no acute intracranial hemorrhage or mass effect. 2.  No acute cervical spine fracture. 3.  Multilevel degenerative changes in the cervical spine are centered at C5-C6. Moderate to severe C5-C6 and C6-C7 spinal canal and neural foraminal narrowing. C6-C7 left paracentral protrusion indents the left ventral cord. Follow-up MRI of the cervical spine may be helpful for further characterization.      Ct Cervical Spine Wo Contrast    Result Date: 5/3/2021  EXAMINATION: CT OF THE HEAD WITHOUT CONTRAST; CT OF THE CERVICAL SPINE WITHOUT CONTRAST 5/3/2021 11:21 pm TECHNIQUE: CT of the head was performed without the administration of intravenous contrast. Dose modulation, iterative reconstruction, and/or weight based adjustment of the mA/kV was utilized to reduce the radiation dose to as low as reasonably achievable.; CT of the cervical spine was performed without the administration of intravenous contrast. Multiplanar reformatted images are provided for review. Dose modulation, iterative reconstruction, and/or weight based adjustment of the mA/kV was utilized to reduce the radiation dose to as low as reasonably achievable. COMPARISON: None. HISTORY: ORDERING SYSTEM PROVIDED HISTORY: L arm numbness TECHNOLOGIST PROVIDED HISTORY: Reason for exam:->L arm numbness Has a \"code stroke\" or \"stroke alert\" been called? ->No Decision Support Exception - unselect if not a suspected or confirmed emergency medical condition->Emergency Medical Condition (MA); ORDERING SYSTEM PROVIDED HISTORY: L neck pain and paresthesia TECHNOLOGIST PROVIDED HISTORY: Reason for exam:->L neck pain and paresthesia Decision Support Exception - unselect if not a suspected or confirmed emergency medical condition->Emergency Medical Condition (MA) FINDINGS: CT CERVICAL SPINE: BONES/ALIGNMENT: No acute fracture. Mild reversal of the normal cervical lordosis is centered at C4-C5. No significant listhesis. The craniocervical junction is intact. No destructive osseous lesion. DEGENERATIVE CHANGES: Multilevel degenerative changes in the cervical spine, centered at C5-C6. Moderate to severe C5-C6 and C6-C7 spinal canal stenosis secondary to disc osteophyte complexes. Moderate to severe bilateral neural foraminal narrowing at C5-C6 and C6-C7 secondary to uncovertebral and facet joint disease. C6-C7 left paracentral protrusion indents the left ventral cord. SOFT TISSUES: There is no prevertebral soft tissue swelling. CT HEAD: BRAIN/VENTRICLES: There is no acute intracranial hemorrhage, mass effect or midline shift. No abnormal extra-axial fluid collection.   The gray-white differentiation is maintained without evidence of an acute infarct. There is no evidence of hydrocephalus. ORBITS: The visualized portion of the orbits demonstrate no acute abnormality. SINUSES: The visualized paranasal sinuses and mastoid air cells demonstrate no acute abnormality. SOFT TISSUES/SKULL: No acute abnormality of the visualized skull or soft tissues. 1.  No acute intracranial abnormality. Specifically, no acute intracranial hemorrhage or mass effect. 2.  No acute cervical spine fracture. 3.  Multilevel degenerative changes in the cervical spine are centered at C5-C6. Moderate to severe C5-C6 and C6-C7 spinal canal and neural foraminal narrowing. C6-C7 left paracentral protrusion indents the left ventral cord. Follow-up MRI of the cervical spine may be helpful for further characterization. Xr Shoulder Left (min 2 Views)    Result Date: 4/27/2021  EXAMINATION: THREE XRAY VIEWS OF THE LEFT SHOULDER 4/26/2021 11:09 pm COMPARISON: None. HISTORY: ORDERING SYSTEM PROVIDED HISTORY: fall, pain TECHNOLOGIST PROVIDED HISTORY: Reason for exam:->fall, pain FINDINGS: No evidence of acute fracture or dislocation of the left shoulder. Acromioclavicular and glenohumeral joints are within normal limits. Normal radiographic appearance of the left shoulder. Pain:  Current: 3/10     Best: 3/10     Worst:9/10    Symptom Type/Quality: sharp, aching  Location[de-identified] Neck: left lateral neck with radiation to left arm, including ring finger and little finger. Behavior: condition is getting worse      Provoking Activities/Positions: Lifting, pulling                 Relieving Activitie/Positions: Heat, ice     Disturbed Sleep:  Yes [x]    No []    Occupation:  . Physical demands include: Heavy Lifting, Awkward Positions and Tool Use.   Status: Full Time      Hobbies: Motorcycles, 4 wheelers , cars  Patient Goals: Pain control    Medical Management for Current Problem:  [] Chiro  []  CT  [] Injection:    [x]  Meds:   []  MRI:  []  Ortho  []  PCP  []  PM&R  []  PT/OT  [x]  X-ray:  []  Other:     Contraindications/Precautions:    OBJECTIVE:     Inspection:  Standing    Cervical: Forward Head   [x] Normal   []Mild   [] Moderate   []Severe      Thoracic:         [x] Normal   [] Increased Kyphosis  [] Decreased Kyphosis    Lumbar:   [x] Normal   [] Increased Lordosis   [] Decreased Lordosis           Range of Motion:    Neck:       cerv   Flex=        WNL   Ext=          8  Deg   SB left=     35 Deg   SB right=   35 Deg   Rot left=    52 Deg   Rot right=   49Deg    Upper Extremity:   Right:   [x] Normal   [] Limited    Left:   [x] Normal   [] Limited     Strength:     Neck: 4/5   R UE: 5/5   L UE: 4-/5               left= 65#               right= 80#    Palpation: Tender to palpation  Left UT  Sensation: intact to light touch      Special Tests:     [x] Cervical Distraction [x]+ / [] -  Decreased pain  [x] Spurling's Compression Test         []+ / [x] -         ASSESSMENT     Problems:    1. Pain reported 3-9/10  2. Decreased ROM C/S  3. Decreased Strength LUE  4. Decreased functional ability with   lifting, pushing, pulling,      [x] There are no barriers affecting plan of care or recovery    [] Barriers to this patient's plan of care or recovery include. Domestic Concerns:  [x] No  [] Yes:    Patient has  limited cerv ROM and strength and LUE strength which limits  his ability to perform work tasks  . Treatment will focus of regaining ROM and strength of C/S and LUE to aid in resumption of safe functional activities and safe work performance. Short Term goals (3 weeks)  1. Decrease reported pain to 5/10  2. Increase ROM to C/S / by 5 DEG  3. Increase Strength by 1/3 mm grade  4. Independent with Home Exercise Programs  Long Term goals (6 weeks)  1. Decrease reported pain to 0-3/10  2. Increase ROM C/S to WNL  3. Increase Strength to WNL   4.  Able to perform/complete the following functions/tasks:     lifting, pushing, pulling,  head turns,        Outcome Measure: NDI =21, 42%    Rehab Potential: [x] Good  [] Fair  [] Poor    PLAN       Treatment Plan:  [x] Therapeutic Exercise  [x] Therapeutic Activity  [x] Neuromuscular Re-education   [] Gait Training  [] Balance Training  [] Aerobic conditioning  [] Manual Therapy  [] Massage/Fascial release   [] Work/Sport specific activities   [x] Cold/hotpack  [] Vasocompression  [x] Electrical Stimulation  [x] Lumbar/Cervical Traction  [] Ultrasound   [] Iontophoresis: 4 mg/mL Dexamethasone Sodium Phosphate 40-80 mAmin    [x] Instruction in HEP      []  Medication allergies reviewed for use of Dexamethasone Sodium Phosphate 4mg/ml  with iontophoresis treatments. Patient is not allergic. Suggested Professional Referral: [x] No  [] Yes:     The following CPT codes are likely to be used in the care of this patient: 64840 PT Evaluation: Moderate Complexity , 95020 Therapeutic Exercise , 02080 Therapeutic Activities , 06858 Mechanical Traction ,  Electrical Stimulation    Patient Education:  [x] Plans/Goals, Risks/Benefits discussed  [x] Home exercise program  Method of Education: [x] Verbal  [x] Demo  [x] Written  Comprehension of Education:  [x] Verbalizes understanding. [x] Demonstrates understanding. [] Needs Review. [] Demonstrates/verbalizes understanding of HEP/Ed previously given. Frequency:   2 times per week for 6 weeks    Patient understands diagnosis/prognosis and consents to treatment, plan and goals: [x] Yes    [] No     Thank you for the opportunity to work with your patient. If you have questions or comments, please contact me at [phone]; fax: [phone].     Electronically signed by: Ronnell Gonzalez, PT         Please sign Physician's Certification and return to:  [address] [place], [place] [address] [place] [id number]  Dept: [phone]  Dept Fax: [phone]: [phone]    [id number] Certification / Comments     Frequency/Duration 2  / week for  6 weeks. Certification period From: 5/14/2021  To: 7/14/2021    I have reviewed the Plan of Care established for skilled therapy services and certify that the services are required and that they will be provided while the patient is under my care.     Physician's Comments/Revisions:               Physician's Printed Name:                                           [de-identified] Signature:                                                               Date:

## 2021-05-14 NOTE — PROGRESS NOTES
[facility]  Physical Therapy Daily Treatment Note    Date: 2021  Patient Name: Larry Macedo  : 1985   MRN: [id number]     Referring Provider: Jordan Benz, [address]. [facility],  [address] [facility]     Medical Diagnosis: Cerv radiculopathy M54.12     Outcome Measure: NDI= 21, 42%    S: See eval  O: Pt given HEP  Time 15:15-15:50     Visit      Pain 4/10     ROM      Modalities      MH + ES C/S left shoulder       Cerv traction            Manual                               THEREX      UBE       Shrugs      Shoulder Press      Shldr abd DB      Shldr flex DB      Shoulder ER      Cerv ext isometric             THERAPEUTIC ACTIVITY Therapeutic activities for increased ROM for functional home tasks Large, functional, dynamic, global movements used to build strength, balance, endurance, and flexibility and to improve physical performance. ROWS: H      ROWS: M      ROWS: L      Low obliques with head follow      High obliques with head follow      Shoulder flexion with head follow      Punches                        A:  Tolerated well. Above added to written HEP.   P: Continue with rehab plan  Pramod Wilder PT    Treatment Charges: Mins Units   Initial Evaluation 35 1   Re-Evaluation     Ther Exercise         TE     Manual Therapy     MT     Ther Activities        TA     Gait Training          GT     Neuro Re-education NR     Modalities     Non-Billable Service Time     Other     Total Time/Units 35 1

## 2021-05-26 ENCOUNTER — HOSPITAL ENCOUNTER (OUTPATIENT)
Dept: PHYSICAL THERAPY | Age: 36
Setting detail: THERAPIES SERIES
Discharge: HOME | End: 2021-05-26
Payer: COMMERCIAL

## 2021-05-26 PROCEDURE — G0283 ELEC STIM OTHER THAN WOUND: HCPCS

## 2021-05-26 PROCEDURE — 97012 MECHANICAL TRACTION THERAPY: CPT

## 2021-05-26 PROCEDURE — 97110 THERAPEUTIC EXERCISES: CPT

## 2021-05-26 NOTE — PROGRESS NOTES
[facility]  Physical Therapy Daily Treatment Note  Date: 2021  Patient Name: Rocio Thurston  : 1985   MRN: [id number]  Referring Provider: Maggi Tucker, [address]. [place],  [address] [place] [address]     Medical Diagnosis: Cerv radiculopathy M54.12     Outcome Measure: NDI= 21, 42%    S: Patient is performing HEP, trouble with chin tucks- will go easier or stop if continues to bother him. Patient continues with intermittent left arm, N/T  O:   Time 2300-3777    Visit     Pain 4/10    ROM     Modalities     MH + ES C/S left shoulder X 15 min, seated    Cerv traction , Intermittent 26# x 15 min    THEREX     UBE      Shrugs     Shoulder Press     Shldr abd DB     Shldr flex DB     Shoulder ER Blue 2 x10 L, x 20 R    Cerv ext isometric      THERAPEUTIC ACTIVITY Therapeutic activities for increased ROM for functional home tasks Large, functional, dynamic, global movements used to build strength, balance, endurance, and flexibility and to improve physical performance. Pull Down Blue x 20    ROWS: M Blue x 20    ROWS: L Blue x 20    Low obliques with head follow     High obliques with head follow     Shoulder flexion with head follow     Punches Blue x 20    A:  Tolerated well.     P: Continue with rehab plan  Donato Lynn PTA    Treatment Charges: Mins Units   Initial Evaluation     Re-Evaluation     Ther Exercise         TE 18 1   Manual Therapy     MT     Ther Activities        TA 3 0   Gait Training          GT     Neuro Re-education NR     Modalities 30 2   Non-Billable Service Time     Other     Total Time/Units 51 3

## 2021-05-28 ENCOUNTER — HOSPITAL ENCOUNTER (OUTPATIENT)
Dept: PHYSICAL THERAPY | Age: 36
Setting detail: THERAPIES SERIES
Discharge: HOME | End: 2021-05-28
Payer: COMMERCIAL

## 2021-05-28 PROCEDURE — 97012 MECHANICAL TRACTION THERAPY: CPT

## 2021-05-28 PROCEDURE — G0283 ELEC STIM OTHER THAN WOUND: HCPCS

## 2021-05-28 NOTE — PROGRESS NOTES
[facility]  Physical Therapy Daily Treatment Note  Date: 2021  Patient Name: Glenda Medeiros  : 1985   MRN: [id number]  Referring Provider: Vida Estrada, [address]. [facility],  215 [facility]     Medical Diagnosis: Cerv radiculopathy M54.12     Outcome Measure: NDI= 21, 42%    S: Patient is performing HEP. Patient continues with intermittent left arm, N/T. He was sore after last treatment, but maybe a little less pain last 2 days. O: Patient arrived late, only modalities performed  Time 0599-9445 2x/week x 6 weeks   Visit 3/12    Pain 4/10    ROM     Modalities     MH + ES C/S left shoulder X 15 min, seated    Cerv traction , Intermittent 28# x 15 min    THEREX     UBE      Shrugs     Shoulder Press     Shldr abd DB     Shldr flex DB     Shoulder ER     Cerv ext isometric      THERAPEUTIC ACTIVITY Therapeutic activities for increased ROM for functional home tasks Large, functional, dynamic, global movements used to build strength, balance, endurance, and flexibility and to improve physical performance. Pull Down    ROWS: M    ROWS: L    Low obliques with head follow     High obliques with head follow     Shoulder flexion with head follow     Punches    A:  Tolerated well. P: Continue with rehab plan.   Gee Cooper PTA    Treatment Charges: Mins Units   Initial Evaluation     Re-Evaluation     Ther Exercise         TE     Manual Therapy     MT     Ther Activities        TA     Gait Training          GT     Neuro Re-education NR     Modalities 30 2   Non-Billable Service Time     Other     Total Time/Units 30 2

## 2021-06-11 ENCOUNTER — HOSPITAL ENCOUNTER (OUTPATIENT)
Dept: PHYSICAL THERAPY | Age: 36
Setting detail: THERAPIES SERIES
Discharge: HOME | End: 2021-06-11
Payer: COMMERCIAL

## 2021-06-11 PROCEDURE — 97110 THERAPEUTIC EXERCISES: CPT

## 2021-06-11 PROCEDURE — 97012 MECHANICAL TRACTION THERAPY: CPT

## 2021-06-11 PROCEDURE — G0283 ELEC STIM OTHER THAN WOUND: HCPCS

## 2021-06-11 NOTE — PROGRESS NOTES
[facility]  Physical Therapy Daily Treatment Note  Date: 2021  Patient Name: David Celestin  : 1985   MRN: [id number]  Referring Provider: Cyndi Randall, [address]. [facility],  [address] [place] [address]     Medical Diagnosis: Cerv radiculopathy M54.12     Outcome Measure: NDI= 21, 42%    S: Patient is performing HEP. Patient continues with intermittent left arm, N/T. He was really sore after last treatment. O:  Time 4901-6335 2x/week x 6 weeks   Visit     Pain 4/10    ROM     Modalities     MH + ES C/S left shoulder X 15 min, seated    Cerv traction , Intermittent 25# x 15 min 1 cervical wrapped around neck  1 cervical L shoulder and scapula   THEREX     UBE      Shrugs     Shoulder Press     Shldr abd DB     Shldr flex DB     Shoulder ER Blue 2 x10 each    Cerv ext isometric      THERAPEUTIC ACTIVITY Therapeutic activities for increased ROM for functional home tasks Large, functional, dynamic, global movements used to build strength, balance, endurance, and flexibility and to improve physical performance. Pull Down Blue x 20    ROWS: M Blue x 20    ROWS: L Blue x 20    Low obliques with head follow     High obliques with head follow     Shoulder flexion with head follow     Punches Blue x 20    A:  Tolerated well. P: Continue with rehab plan.   Geeta Allen PTA    Treatment Charges: Mins Units   Initial Evaluation     Re-Evaluation     Ther Exercise         TE 10 1   Manual Therapy     MT     Ther Activities        TA     Gait Training          GT     Neuro Re-education NR     Modalities 30 2   Non-Billable Service Time     Other     Total Time/Units 40 3

## 2021-06-14 ENCOUNTER — HOSPITAL ENCOUNTER (OUTPATIENT)
Dept: PHYSICAL THERAPY | Age: 36
Setting detail: THERAPIES SERIES
Discharge: HOME | End: 2021-06-14
Payer: COMMERCIAL

## 2021-06-18 ENCOUNTER — HOSPITAL ENCOUNTER (OUTPATIENT)
Dept: PHYSICAL THERAPY | Age: 36
Setting detail: THERAPIES SERIES
Discharge: HOME | End: 2021-06-18
Payer: COMMERCIAL

## 2021-06-18 PROCEDURE — 97012 MECHANICAL TRACTION THERAPY: CPT | Performed by: PHYSICAL THERAPIST

## 2021-06-18 PROCEDURE — 97110 THERAPEUTIC EXERCISES: CPT | Performed by: PHYSICAL THERAPIST

## 2021-06-18 PROCEDURE — G0283 ELEC STIM OTHER THAN WOUND: HCPCS | Performed by: PHYSICAL THERAPIST

## 2021-06-18 NOTE — PROGRESS NOTES
[facility]  Physical Therapy Daily Treatment Note  Date: 2021  Patient Name: Bonnie Rodas  : 1985   MRN: [id number]  Referring Provider: Ramesh Carballo, [address]. [facility],  215 [facility]     Medical Diagnosis: Cerv radiculopathy M54.12     Outcome Measure: NDI= 21, 42%    S: Patient is performing HEP. Patient continues with intermittent left arm, N/T.     O:  Time 5922-6804 2x/week x 6 weeks   Visit     Pain 4/10    ROM     Modalities     MH + ES C/S left shoulder X 15 min, seated    Cerv traction , Intermittent 25# x 15 min 1 cervical wrapped around neck  1 cervical L shoulder and scapula   THEREX     UBE      Shrugs     Shoulder Press     Shldr abd DB     Shldr flex DB     Shoulder ER Blue 2 x10 each    Cerv ext isometric      THERAPEUTIC ACTIVITY Therapeutic activities for increased ROM for functional home tasks Large, functional, dynamic, global movements used to build strength, balance, endurance, and flexibility and to improve physical performance. Pull Down Blue x 20    ROWS: M Blue x 20    ROWS: L Blue x 20    Low obliques with head follow     High obliques with head follow     Shoulder flexion with head follow     Punches Blue x 20    A:  Tolerated well. P: Continue with rehab plan.   Hannah Ta PT    Treatment Charges: Mins Units   Initial Evaluation     Re-Evaluation     Ther Exercise         TE 10 1   Manual Therapy     MT     Ther Activities        TA     Gait Training          GT     Neuro Re-education NR     Modalities 30 2   Non-Billable Service Time     Other     Total Time/Units 40 3

## 2021-06-21 ENCOUNTER — HOSPITAL ENCOUNTER (OUTPATIENT)
Dept: PHYSICAL THERAPY | Age: 36
Setting detail: THERAPIES SERIES
Discharge: HOME | End: 2021-06-21
Payer: COMMERCIAL

## 2021-06-21 PROCEDURE — G0283 ELEC STIM OTHER THAN WOUND: HCPCS

## 2021-06-21 PROCEDURE — 97012 MECHANICAL TRACTION THERAPY: CPT

## 2021-06-23 ENCOUNTER — HOSPITAL ENCOUNTER (OUTPATIENT)
Dept: PHYSICAL THERAPY | Age: 36
Setting detail: THERAPIES SERIES
Discharge: HOME | End: 2021-06-23
Payer: COMMERCIAL

## 2021-06-23 PROCEDURE — 97012 MECHANICAL TRACTION THERAPY: CPT | Performed by: PHYSICAL THERAPIST

## 2021-06-23 PROCEDURE — G0283 ELEC STIM OTHER THAN WOUND: HCPCS | Performed by: PHYSICAL THERAPIST

## 2021-06-23 NOTE — PROGRESS NOTES
[facility]  Physical Therapy Daily Treatment Note  Date: 2021  Patient Name: Eulalio Greene  : 1985   MRN: [id number]  Referring Provider: Teresa Castaneda, [address]. [place],  [address] [place] [address]     Medical Diagnosis: Cerv radiculopathy M54.12     Outcome Measure: NDI= 21, 42%    S: Patient is performing HEP. Patient continues with intermittent left arm, N/T but less in intensity last 2 weeks. O: Limited therex due to patient arrived 15 minutes late   Time 5820-8111 2x/week x 6 weeks   Visit     Pain 4/10    ROM     Modalities     MH + ES C/S left shoulder X 15 min, seated 1 cervical wrapped around neck  1 cervical L shoulder and scapula   Cerv traction , Intermittent 26# x 15 min    THEREX     UBE      Shrugs     Shoulder Press     Shldr abd DB     Shldr flex DB     Shoulder ER     Cerv ext isometric      THERAPEUTIC ACTIVITY Therapeutic activities for increased ROM for functional home tasks Large, functional, dynamic, global movements used to build strength, balance, endurance, and flexibility and to improve physical performance. Pull Down Blue x 20   ROWS: M Blue x 20   ROWS: L    Low obliques with head follow     High obliques with head follow     Shoulder flexion with head follow     Punches     A:  Tolerated well. P: Continue with rehab plan.   Susana Trinh, PT    Treatment Charges: Mins Units   Initial Evaluation     Re-Evaluation     Ther Exercise         TE     Manual Therapy     MT     Ther Activities        TA     Gait Training          GT     Neuro Re-education NR     Modalities 30 2   Non-Billable Service Time     Other     Total Time/Units 40 3

## 2021-06-25 ENCOUNTER — APPOINTMENT (OUTPATIENT)
Dept: PHYSICAL THERAPY | Age: 36
End: 2021-06-25
Payer: COMMERCIAL

## 2021-07-07 ENCOUNTER — HOSPITAL ENCOUNTER (OUTPATIENT)
Dept: PHYSICAL THERAPY | Age: 36
Setting detail: THERAPIES SERIES
Discharge: HOME | End: 2021-07-07
Payer: COMMERCIAL

## 2021-07-07 PROCEDURE — G0283 ELEC STIM OTHER THAN WOUND: HCPCS

## 2021-07-07 PROCEDURE — 97530 THERAPEUTIC ACTIVITIES: CPT

## 2021-07-07 PROCEDURE — 97012 MECHANICAL TRACTION THERAPY: CPT

## 2021-07-07 NOTE — PROGRESS NOTES
[facility]  Physical Therapy Daily Treatment Note  Date: 2021  Patient Name: Kiley Diana  : 1985   MRN: [id number]  Referring Provider: Georgi Andres, [address]. [place],  215 [facility]     Medical Diagnosis: Cerv radiculopathy M54.12     Outcome Measure: NDI= 21, 42%    S: Patient is performing HEP. Patient continues with intermittent left arm, N/T but less in intensity last 3 weeks. O:  Time 6373-5393 2x/week x 6 weeks   Visit     Pain 4/10    ROM     Modalities     MH + ES C/S left shoulder X 15 min, seated 1 cervical wrapped around neck  1 cervical L shoulder and scapula   Cerv traction , Intermittent 26# x 15 min    THEREX     UBE      Shrugs     Shoulder Press     Shldr abd DB     Shldr flex DB     Shoulder ER Blue 2 x10 each    Cerv ext isometric      THERAPEUTIC ACTIVITY Therapeutic activities for increased ROM for functional home tasks Large, functional, dynamic, global movements used to build strength, balance, endurance, and flexibility and to improve physical performance. Pull Down Blue x 20   ROWS: M Blue x 20   ROWS: L Blue x 20   Low obliques with head follow     High obliques with head follow     Shoulder flexion with head follow     Punches Blue x 20    A:  Tolerated well. P: Continue with rehab plan.   Kristyn Davidson PTA    Treatment Charges: Mins Units   Initial Evaluation     Re-Evaluation     Ther Exercise         TE     Manual Therapy     MT     Ther Activities        TA 15 1   Gait Training          GT     Neuro Re-education NR     Modalities 30 2   Non-Billable Service Time 4    Other     Total Time/Units 49 3

## 2021-07-09 ENCOUNTER — HOSPITAL ENCOUNTER (OUTPATIENT)
Dept: PHYSICAL THERAPY | Age: 36
Setting detail: THERAPIES SERIES
Discharge: HOME | End: 2021-07-09
Payer: COMMERCIAL

## 2021-07-09 PROCEDURE — G0283 ELEC STIM OTHER THAN WOUND: HCPCS

## 2021-07-09 PROCEDURE — 97012 MECHANICAL TRACTION THERAPY: CPT

## 2021-07-09 PROCEDURE — 97530 THERAPEUTIC ACTIVITIES: CPT

## 2021-07-09 NOTE — PROGRESS NOTES
[facility]  Physical Therapy Daily Treatment Note  Date: 2021  Patient Name: Mare Larson  : 1985   MRN: [id number]  Referring Provider: Stephanie Sood, [address]. [place],  [address] [facility]     Medical Diagnosis: Cerv radiculopathy M54.12     Outcome Measure: NDI= 21, 42%    S: Patient is performing HEP. Patient continues with intermittent left arm, N/T but less in intensity last 3 weeks. O:  Time 1652- 2x/week x 6 weeks   Visit     Pain /10    ROM     Modalities     MH + ES C/S left shoulder X 12 min, seated 1 cervical wrapped around neck  1 cervical L shoulder and scapula   Cerv traction , Intermittent 26# x 15 min    THEREX     UBE      Shrugs     Shoulder Press     Shldr abd DB     Shldr flex DB     Shoulder ER Blue 2 x10 each    Cerv ext isometric      THERAPEUTIC ACTIVITY Therapeutic activities for increased ROM for functional home tasks Large, functional, dynamic, global movements used to build strength, balance, endurance, and flexibility and to improve physical performance. Pull Down Blue x 20   ROWS: M Blue x 20   ROWS: L Blue x 20   Low obliques with head follow     High obliques with head follow     Shoulder flexion with head follow     Punches Blue x 20    A:  Tolerated well. P: Continue with rehab plan.   Leland Jerome PTA    Treatment Charges: Mins Units   Initial Evaluation     Re-Evaluation     Ther Exercise         TE     Manual Therapy     MT     Ther Activities        TA 15 1   Gait Training          GT     Neuro Re-education NR     Modalities 27 2   Non-Billable Service Time     Other     Total Time/Units 42 3

## 2021-07-12 ENCOUNTER — HOSPITAL ENCOUNTER (OUTPATIENT)
Dept: PHYSICAL THERAPY | Age: 36
Setting detail: THERAPIES SERIES
Discharge: HOME | End: 2021-07-12
Payer: COMMERCIAL

## 2021-07-12 PROCEDURE — 97012 MECHANICAL TRACTION THERAPY: CPT

## 2021-07-12 PROCEDURE — 97530 THERAPEUTIC ACTIVITIES: CPT

## 2021-07-12 PROCEDURE — G0283 ELEC STIM OTHER THAN WOUND: HCPCS

## 2021-07-12 NOTE — PROGRESS NOTES
[facility]  Physical Therapy Daily Treatment Note  Date: 2021  Patient Name: Andrea Mi  : 1985   MRN: [id number]  Referring Provider: Hu Osorio, [address]. [place],  [address] [facility]     Medical Diagnosis: Cerv radiculopathy M54.12     Outcome Measure: NDI= 21, 42%    S: Patient reports very little to no N/T today, has had significant improvement last few weeks. Still gets symptoms down left arm intermittently, but less intense than before. O: Added high obliques  Time 1483-2881 2x/week x 6 weeks   Visit 10/12    Pain 0-1/10    ROM     Modalities     MH + ES C/S left shoulder X 15 min, seated 1 cervical wrapped around neck  1 cervical L shoulder and scapula   Cerv traction , Intermittent 27# x 15 min    THEREX     UBE      Shrugs     Shoulder Press     Shldr abd DB     Shldr flex DB     Shoulder ER Blue 2 x10 each    Cerv ext isometric      THERAPEUTIC ACTIVITY Therapeutic activities for increased ROM for functional home tasks Large, functional, dynamic, global movements used to build strength, balance, endurance, and flexibility and to improve physical performance. Pull Down Blue x 20   ROWS: M Blue x 20   ROWS: L Blue x 20   Low obliques with head follow     High obliques with head follow Blue x 20 each    Shoulder flexion with head follow     Punches Blue x 20    A:  Tolerated well. P: Continue with rehab plan.   Rachel Figueroa PTA    Treatment Charges: Mins Units   Initial Evaluation     Re-Evaluation     Ther Exercise         TE     Manual Therapy     MT     Ther Activities        TA 15 1   Gait Training          GT     Neuro Re-education NR     Modalities 30 2   Non-Billable Service Time     Other     Total Time/Units 45 3

## 2021-07-16 ENCOUNTER — HOSPITAL ENCOUNTER (OUTPATIENT)
Dept: PHYSICAL THERAPY | Age: 36
Setting detail: THERAPIES SERIES
Discharge: HOME | End: 2021-07-16
Payer: COMMERCIAL

## 2021-07-16 NOTE — PROGRESS NOTES
Physical Therapy Progress Note    Date: 2021  Patient Name: Marcellus Rodriguez  : 1985   MRN: [id number]    Pt called to cancel PT appt. today    Lena Najera PT

## 2021-07-19 ENCOUNTER — HOSPITAL ENCOUNTER (OUTPATIENT)
Dept: PHYSICAL THERAPY | Age: 36
Setting detail: THERAPIES SERIES
Discharge: HOME | End: 2021-07-19
Payer: COMMERCIAL

## 2021-07-19 PROCEDURE — 97012 MECHANICAL TRACTION THERAPY: CPT

## 2021-07-19 PROCEDURE — G0283 ELEC STIM OTHER THAN WOUND: HCPCS

## 2021-07-19 PROCEDURE — 97530 THERAPEUTIC ACTIVITIES: CPT

## 2021-07-19 NOTE — PROGRESS NOTES
[facility]  Physical Therapy Daily Treatment Note  Date: 2021  Patient Name: Cain Cadena  : 1985   MRN: [id number]  Referring Provider: Alexi Roberts, [address]. [place],  [address] [facility]     Medical Diagnosis: Cerv radiculopathy M54.12     Outcome Measure: NDI= 21, 42%    S: Patient reports having a rough day at work resulting in pain > N/T in left UE from shoulder to elbow. O:   Time 7022-2008 2x/week x 6 weeks   Visit     Pain 0-1/10    ROM     Modalities     MH + ES C/S left shoulder X 15 min, seated 1 cervical wrapped around neck  1 cervical L shoulder and scapula   Cerv traction , Intermittent 29# x 15 min    THEREX     UBE      Shrugs     Shoulder Press     Shldr abd DB     Shldr flex DB     Shoulder ER Blue 2 x10 each    Cerv ext isometric      THERAPEUTIC ACTIVITY Therapeutic activities for increased ROM for functional home tasks Large, functional, dynamic, global movements used to build strength, balance, endurance, and flexibility and to improve physical performance. Pull Down Blue x 20   ROWS: M Blue x 20   ROWS: L Blue x 20   Low obliques with head follow     High obliques with head follow Blue x 20 each    Shoulder flexion with head follow     Punches Blue x 20    A:  Tolerated well. P: Continue with rehab plan.   Jaden Muñoz PTA    Treatment Charges: Mins Units   Initial Evaluation     Re-Evaluation     Ther Exercise         TE     Manual Therapy     MT     Ther Activities        TA 8 1   Gait Training          GT     Neuro Re-education NR     Modalities 30 2   Non-Billable Service Time     Other     Total Time/Units 38 3

## 2021-07-21 ENCOUNTER — HOSPITAL ENCOUNTER (OUTPATIENT)
Dept: PHYSICAL THERAPY | Age: 36
Setting detail: THERAPIES SERIES
Discharge: HOME | End: 2021-07-21
Payer: COMMERCIAL

## 2021-07-21 PROCEDURE — 97012 MECHANICAL TRACTION THERAPY: CPT

## 2021-07-21 PROCEDURE — 97530 THERAPEUTIC ACTIVITIES: CPT

## 2021-07-21 PROCEDURE — G0283 ELEC STIM OTHER THAN WOUND: HCPCS

## 2021-07-21 NOTE — PROGRESS NOTES
[facility]  Physical Therapy Daily Treatment Note  Date: 2021  Patient Name: Rd aMtos  : 1985   MRN: [id number]  Referring Provider: Naveed Chin, [address]. [place],  [place] [place]      Medical Diagnosis: Cerv radiculopathy M54.12     Outcome Measure: NDI= 13, 26% (21)    S: Patient reports no N/T at this time, it is intermittent; overall less in intensity when present. Patient states a good understanding of HEP.  O:   Time 2590-5055 2x/week x 6 weeks   Visit     Pain 0-1/10    ROM     Modalities     MH + ES C/S left shoulder0 X 15 min, seated 1 cervical wrapped around neck  1 cervical L shoulder and scapula   Cerv traction , Intermittent 29# x 15 min    THEREX     UBE      Shrugs     Shoulder Press     Shldr abd DB     Shldr flex DB     Shoulder ER Blue x 20 each    Cerv ext isometric      THERAPEUTIC ACTIVITY Therapeutic activities for increased ROM for functional home tasks Large, functional, dynamic, global movements used to build strength, balance, endurance, and flexibility and to improve physical performance. Pull Down Blue x 20   ROWS: M Blue x 20   ROWS: L Blue x 20   Low obliques with head follow     High obliques with head follow Blue x 20 each    Shoulder flexion with head follow     Punches Blue x 20    A:  Tolerated well. Patient was given blue theratubing and additional exercises to HEP, see copy. See Progress Report for current status. Copy of report to be sent to physician. P: Patient has completed current script, plans to call physician for appointment, schedule MRI. Patient reports independent with HEP.   Debi Conley PTA    Treatment Charges: Mins Units   Initial Evaluation     Re-Evaluation     Ther Exercise         TE     Manual Therapy     MT     Ther Activities        TA 10 1   Gait Training          GT     Neuro Re-education NR     Modalities 30 2   Non-Billable Service Time     Other     Total Time/Units 40 3

## 2021-07-21 NOTE — PROGRESS NOTES
Physical Therapy   DISCHARGE NOTE    PATIENT: Ari Le   Date: 7/21/2021  DIAGNOSIS:  Cerv radiculopathy  PHYSICIAN: [place] Pulse, [address] [facility] [facility]. [facility],  [address]     ATTENDANCE:  12  OUT OF 16  APPOINTMENT FROM   TO 07/21/21  TREATMENTS RECEIVED:  THEREX,   ES , MH ,   HEP, TRACTION, ROM    INITIAL PROBLEM CURRENT STATUS       PAIN 3-9/10  constant  0-5/10 intermittent       DECREASED ROM C/S   cerv       Ext=             8 Deg   Rot left=      52 Deg   Rot right=    49 Deg           Ext=            42 Deg   Rot left=     67 Deg              Rot right=   74 Deg       DECREASED STRENGTH Cer mm      cerv   Flex=           4/5              Ext=            4/5         SB left=       4/5     SB right=     4/5       Flex=           5/5              Ext=            5/5         SB left=       5/5               SB right=    5/5      strength Left = 65#  strength Left = 76#     COMMENTS/ RECOMMENDATIONS: Pt has progressed well with improved strength, ROM and pain relief. Frequency of pain radiating into left hand has decreased dramatically. Pt given a written HEP and information regarding home Cerv Traction units. Pt encouraged to continue with HEP.       [address] YOU FOR THE OPPORTUNITY TO WORK WITH WITH THIS PATIENT  Nuris Johnson PT            IF YOU HAVE ANY QUESTIONS OR COMMENTS, PLEASE FEEL FREE TO CONTACT ME AT   [address] [address]  FAX : [phone]  PHONE: [phone]

## 2021-08-16 ENCOUNTER — OFFICE VISIT (OUTPATIENT)
Dept: NEUROSURGERY | Age: 36
End: 2021-08-16
Payer: COMMERCIAL

## 2021-08-16 VITALS
HEART RATE: 88 BPM | OXYGEN SATURATION: 98 % | DIASTOLIC BLOOD PRESSURE: 90 MMHG | HEIGHT: 72 IN | BODY MASS INDEX: 29.8 KG/M2 | SYSTOLIC BLOOD PRESSURE: 122 MMHG | RESPIRATION RATE: 15 BRPM | WEIGHT: 220 LBS | TEMPERATURE: 98.9 F

## 2021-08-16 DIAGNOSIS — M54.12 CERVICAL RADICULOPATHY: Primary | ICD-10-CM

## 2021-08-16 PROCEDURE — 99213 OFFICE O/P EST LOW 20 MIN: CPT

## 2021-08-16 NOTE — PATIENT INSTRUCTIONS
Patient Education        Neck Pain: Care Instructions  Your Care Instructions     You can have neck pain anywhere from the bottom of your head to the top of your shoulders. It can spread to the upper back or arms. Injuries, painting a ceiling, sleeping with your neck twisted, staying in one position for too long, and many other activities can cause neck pain. Most neck pain gets better with home care. Your doctor may recommend medicine to relieve pain or relax your muscles. He or she may suggest exercise and physical therapy to increase flexibility and relieve stress. You may need to wear a special (cervical) collar to support your neck for a day or two. Follow-up care is a key part of your treatment and safety. Be sure to make and go to all appointments, and call your doctor if you are having problems. It's also a good idea to know your test results and keep a list of the medicines you take. How can you care for yourself at home? · Try using a heating pad on a low or medium setting for 15 to 20 minutes every 2 or 3 hours. Try a warm shower in place of one session with the heating pad. · You can also try an ice pack for 10 to 15 minutes every 2 to 3 hours. Put a thin cloth between the ice and your skin. · Take pain medicines exactly as directed. ? If the doctor gave you a prescription medicine for pain, take it as prescribed. ? If you are not taking a prescription pain medicine, ask your doctor if you can take an over-the-counter medicine. · If your doctor recommends a cervical collar, wear it exactly as directed. When should you call for help? Call your doctor now or seek immediate medical care if:    · You have new or worsening numbness in your arms, buttocks or legs.     · You have new or worsening weakness in your arms or legs. (This could make it hard to stand up.)     · You lose control of your bladder or bowels.    Watch closely for changes in your health, and be sure to contact your doctor if:    · Your neck pain is getting worse.     · You are not getting better after 1 week.     · You do not get better as expected. Where can you learn more? Go to [email or web address] and sign in to your [employer] account. Enter [phone] in the [facility] box to learn more about \"Neck Pain: Care Instructions. \"     If you do not have an account, please click on the \"Sign Up Now\" link. Current as of: November 16, 2020               Content Version: 12.9  © 4149-1194 [employer]. Care instructions adapted under license by [facility] ([facility]). If you have questions about a medical condition or this instruction, always ask your healthcare professional. [address] any warranty or liability for your use of this information.

## 2021-08-16 NOTE — PROGRESS NOTES
Follow-up     This is a 31year old who presents to the office for a follow-up for neck pain and cervical radiculopathy     Subjective: Delia Bryant is a 31 y.o.  male seen in neurosurgery clinic for follow up regarding his neck pain and cervical radiculopathy. He reports that PT has helped him considerably and he is experiencing about a 50% reduction in pain. He reports some continued neck pain intermittently, especially when looking up. He also reports some continued radiculopathy down his left arm.      Physical Exam:              WDWN, no apparent distress              Non-labored breathing               Vitals Stable              Alert and oriented x3              CN 3-12 intact              PERRL              EOMI              MCKEE well              Motor strength symmetric              Sensation to LT intact bilaterally                  Imaging: No imaging at this time     Assessment: This is a 31 y.o.  male presenting for a follow-up for neck pain and cervical radiculopathy     Plan:  -PT completed but pain still present  -MRI of cervical spine for further eval  -Follow-up in neurosurgery clinic when imaging complete  -Call or return to neurosurgery office sooner if symptoms worsen or if new issues arise in the interim.     Electronically signed by EMORY Gomez on 8/16/2021 at 10:47 AM

## 2021-08-25 ENCOUNTER — HOSPITAL ENCOUNTER (OUTPATIENT)
Dept: MRI IMAGING | Age: 36
Discharge: HOME | End: 2021-08-27
Payer: COMMERCIAL

## 2021-08-25 DIAGNOSIS — M54.12 CERVICAL RADICULOPATHY: ICD-10-CM

## 2021-08-25 PROCEDURE — 72141 MRI NECK SPINE W/O DYE: CPT

## 2021-09-15 ENCOUNTER — TELEPHONE (OUTPATIENT)
Dept: NEUROSURGERY | Age: 36
End: 2021-09-15

## 2021-09-15 NOTE — TELEPHONE ENCOUNTER
Pt called. Results given. Pt needs an appointment made with Dr. Elida Lepe to discuss surgical options.

## 2021-10-08 ENCOUNTER — OFFICE VISIT (OUTPATIENT)
Dept: NEUROSURGERY | Age: 36
End: 2021-10-08
Payer: COMMERCIAL

## 2021-10-08 VITALS
DIASTOLIC BLOOD PRESSURE: 86 MMHG | OXYGEN SATURATION: 98 % | HEIGHT: 72 IN | TEMPERATURE: 98.4 F | BODY MASS INDEX: 29.8 KG/M2 | HEART RATE: 65 BPM | SYSTOLIC BLOOD PRESSURE: 131 MMHG | WEIGHT: 220 LBS | RESPIRATION RATE: 18 BRPM

## 2021-10-08 DIAGNOSIS — M54.2 NECK PAIN: Primary | ICD-10-CM

## 2021-10-08 PROCEDURE — G8417 CALC BMI ABV UP PARAM F/U: HCPCS | Performed by: NEUROLOGICAL SURGERY

## 2021-10-08 PROCEDURE — G8427 DOCREV CUR MEDS BY ELIG CLIN: HCPCS | Performed by: NEUROLOGICAL SURGERY

## 2021-10-08 PROCEDURE — 4004F PT TOBACCO SCREEN RCVD TLK: CPT | Performed by: NEUROLOGICAL SURGERY

## 2021-10-08 PROCEDURE — G8484 FLU IMMUNIZE NO ADMIN: HCPCS | Performed by: NEUROLOGICAL SURGERY

## 2021-10-08 PROCEDURE — 99213 OFFICE O/P EST LOW 20 MIN: CPT | Performed by: NEUROLOGICAL SURGERY

## 2021-11-18 ENCOUNTER — TELEPHONE (OUTPATIENT)
Dept: NEUROSURGERY | Age: 36
End: 2021-11-18

## 2021-11-18 RX ORDER — GABAPENTIN 300 MG/1
300 CAPSULE ORAL 3 TIMES DAILY
Qty: 90 CAPSULE | Refills: 3 | Status: SHIPPED | OUTPATIENT
Start: 2021-11-18 | End: 2022-10-21 | Stop reason: ALTCHOICE

## 2021-11-18 NOTE — TELEPHONE ENCOUNTER
Valerio Weinberg called stating pt is having a lot of pain. He forgot to get a refill for his pain meds.  Gapapentin

## 2022-09-14 ENCOUNTER — OFFICE VISIT (OUTPATIENT)
Dept: FAMILY MEDICINE CLINIC | Age: 37
End: 2022-09-14
Payer: COMMERCIAL

## 2022-09-14 VITALS
BODY MASS INDEX: 27.36 KG/M2 | SYSTOLIC BLOOD PRESSURE: 130 MMHG | DIASTOLIC BLOOD PRESSURE: 84 MMHG | WEIGHT: 202 LBS | OXYGEN SATURATION: 98 % | RESPIRATION RATE: 18 BRPM | HEART RATE: 80 BPM | HEIGHT: 72 IN

## 2022-09-14 DIAGNOSIS — R73.01 IFG (IMPAIRED FASTING GLUCOSE): ICD-10-CM

## 2022-09-14 DIAGNOSIS — E78.2 MIXED HYPERLIPIDEMIA: ICD-10-CM

## 2022-09-14 DIAGNOSIS — I10 ESSENTIAL HYPERTENSION: ICD-10-CM

## 2022-09-14 DIAGNOSIS — N52.9 ERECTILE DYSFUNCTION, UNSPECIFIED ERECTILE DYSFUNCTION TYPE: ICD-10-CM

## 2022-09-14 DIAGNOSIS — E03.9 ACQUIRED HYPOTHYROIDISM: ICD-10-CM

## 2022-09-14 DIAGNOSIS — I10 BENIGN ESSENTIAL HTN: ICD-10-CM

## 2022-09-14 DIAGNOSIS — E55.9 VITAMIN D DEFICIENCY: ICD-10-CM

## 2022-09-14 DIAGNOSIS — Z30.09 SCREENING AND EVALUATION FOR VASECTOMY: Primary | ICD-10-CM

## 2022-09-14 DIAGNOSIS — R53.82 CHRONIC FATIGUE: ICD-10-CM

## 2022-09-14 DIAGNOSIS — E53.8 VITAMIN B 12 DEFICIENCY: ICD-10-CM

## 2022-09-14 LAB
ALBUMIN SERPL-MCNC: 4.9 G/DL (ref 3.5–5.2)
ALP BLD-CCNC: 57 U/L (ref 40–129)
ALT SERPL-CCNC: 13 U/L (ref 0–40)
ANION GAP SERPL CALCULATED.3IONS-SCNC: 14 MMOL/L (ref 7–16)
AST SERPL-CCNC: 28 U/L (ref 0–39)
BILIRUB SERPL-MCNC: 0.4 MG/DL (ref 0–1.2)
BUN BLDV-MCNC: 9 MG/DL (ref 6–20)
CALCIUM SERPL-MCNC: 10.1 MG/DL (ref 8.6–10.2)
CHLORIDE BLD-SCNC: 99 MMOL/L (ref 98–107)
CHOLESTEROL, TOTAL: 243 MG/DL (ref 0–199)
CO2: 24 MMOL/L (ref 22–29)
CREAT SERPL-MCNC: 0.9 MG/DL (ref 0.7–1.2)
GFR AFRICAN AMERICAN: >60
GFR NON-AFRICAN AMERICAN: >60 ML/MIN/1.73
GLUCOSE BLD-MCNC: 90 MG/DL (ref 74–99)
HCT VFR BLD CALC: 48.4 % (ref 37–54)
HDLC SERPL-MCNC: 48 MG/DL
HEMOGLOBIN: 15.9 G/DL (ref 12.5–16.5)
LDL CHOLESTEROL CALCULATED: 161 MG/DL (ref 0–99)
MCH RBC QN AUTO: 28.5 PG (ref 26–35)
MCHC RBC AUTO-ENTMCNC: 32.9 % (ref 32–34.5)
MCV RBC AUTO: 86.9 FL (ref 80–99.9)
PDW BLD-RTO: 13.5 FL (ref 11.5–15)
PLATELET # BLD: 190 E9/L (ref 130–450)
PMV BLD AUTO: 11.2 FL (ref 7–12)
POTASSIUM SERPL-SCNC: 4.5 MMOL/L (ref 3.5–5)
RBC # BLD: 5.57 E12/L (ref 3.8–5.8)
SODIUM BLD-SCNC: 137 MMOL/L (ref 132–146)
T4 FREE: 1.2 NG/DL (ref 0.93–1.7)
TOTAL PROTEIN: 7.9 G/DL (ref 6.4–8.3)
TRIGL SERPL-MCNC: 168 MG/DL (ref 0–149)
TSH SERPL DL<=0.05 MIU/L-ACNC: 3.11 UIU/ML (ref 0.27–4.2)
VLDLC SERPL CALC-MCNC: 34 MG/DL
WBC # BLD: 11.2 E9/L (ref 4.5–11.5)

## 2022-09-14 PROCEDURE — G8427 DOCREV CUR MEDS BY ELIG CLIN: HCPCS | Performed by: FAMILY MEDICINE

## 2022-09-14 PROCEDURE — G8417 CALC BMI ABV UP PARAM F/U: HCPCS | Performed by: FAMILY MEDICINE

## 2022-09-14 PROCEDURE — 4004F PT TOBACCO SCREEN RCVD TLK: CPT | Performed by: FAMILY MEDICINE

## 2022-09-14 PROCEDURE — 99214 OFFICE O/P EST MOD 30 MIN: CPT | Performed by: FAMILY MEDICINE

## 2022-09-14 RX ORDER — SILDENAFIL CITRATE 20 MG/1
20 TABLET ORAL PRN
Qty: 30 TABLET | Refills: 5 | Status: SHIPPED | OUTPATIENT
Start: 2022-09-14

## 2022-09-14 RX ORDER — LISINOPRIL 10 MG/1
10 TABLET ORAL DAILY
Qty: 90 TABLET | Refills: 5 | Status: SHIPPED | OUTPATIENT
Start: 2022-09-14

## 2022-09-14 SDOH — ECONOMIC STABILITY: FOOD INSECURITY: WITHIN THE PAST 12 MONTHS, YOU WORRIED THAT YOUR FOOD WOULD RUN OUT BEFORE YOU GOT MONEY TO BUY MORE.: NEVER TRUE

## 2022-09-14 SDOH — ECONOMIC STABILITY: FOOD INSECURITY: WITHIN THE PAST 12 MONTHS, THE FOOD YOU BOUGHT JUST DIDN'T LAST AND YOU DIDN'T HAVE MONEY TO GET MORE.: NEVER TRUE

## 2022-09-14 ASSESSMENT — PATIENT HEALTH QUESTIONNAIRE - PHQ9
2. FEELING DOWN, DEPRESSED OR HOPELESS: 0
SUM OF ALL RESPONSES TO PHQ QUESTIONS 1-9: 0
SUM OF ALL RESPONSES TO PHQ QUESTIONS 1-9: 0
SUM OF ALL RESPONSES TO PHQ9 QUESTIONS 1 & 2: 0
1. LITTLE INTEREST OR PLEASURE IN DOING THINGS: 0
SUM OF ALL RESPONSES TO PHQ QUESTIONS 1-9: 0
SUM OF ALL RESPONSES TO PHQ QUESTIONS 1-9: 0

## 2022-09-14 ASSESSMENT — SOCIAL DETERMINANTS OF HEALTH (SDOH): HOW HARD IS IT FOR YOU TO PAY FOR THE VERY BASICS LIKE FOOD, HOUSING, MEDICAL CARE, AND HEATING?: NOT VERY HARD

## 2022-09-14 ASSESSMENT — LIFESTYLE VARIABLES: HOW OFTEN DO YOU HAVE A DRINK CONTAINING ALCOHOL: NEVER

## 2022-09-15 LAB — HBA1C MFR BLD: 5.7 % (ref 4–5.6)

## 2022-09-18 ASSESSMENT — ENCOUNTER SYMPTOMS
ANAL BLEEDING: 0
EYE REDNESS: 0
APNEA: 0
EYE ITCHING: 0
RHINORRHEA: 0
WHEEZING: 0
BACK PAIN: 0
COLOR CHANGE: 0
STRIDOR: 0
NAUSEA: 0
SHORTNESS OF BREATH: 0
COUGH: 0
VOMITING: 0
FACIAL SWELLING: 0
CHOKING: 0
BLOOD IN STOOL: 0
SINUS PRESSURE: 0
DIARRHEA: 0
VOICE CHANGE: 0
CHEST TIGHTNESS: 0
SORE THROAT: 0
EYE DISCHARGE: 0
ABDOMINAL PAIN: 0
PHOTOPHOBIA: 0
RECTAL PAIN: 0
EYE PAIN: 0
ABDOMINAL DISTENTION: 0
CONSTIPATION: 0
TROUBLE SWALLOWING: 0

## 2022-09-19 NOTE — PROGRESS NOTES
Romana Bellamy is a 30 y.o. male  . Subjective:      Patient is here for normal recheck. Once an appointment with urology for vasectomy we will also have him checked at that time for his ED issues. Blood pressures been relatively well controlled. Patient is due for blood work recheck      Review of Systems   Constitutional:  Positive for fatigue. Negative for activity change, appetite change, chills, diaphoresis, fever and unexpected weight change. HENT:  Negative for congestion, dental problem, drooling, ear discharge, ear pain, facial swelling, hearing loss, mouth sores, nosebleeds, postnasal drip, rhinorrhea, sinus pressure, sneezing, sore throat, tinnitus, trouble swallowing and voice change. Eyes:  Negative for photophobia, pain, discharge, redness, itching and visual disturbance. Respiratory:  Negative for apnea, cough, choking, chest tightness, shortness of breath, wheezing and stridor. Cardiovascular:  Negative for chest pain, palpitations and leg swelling. Gastrointestinal:  Negative for abdominal distention, abdominal pain, anal bleeding, blood in stool, constipation, diarrhea, nausea, rectal pain and vomiting. Endocrine: Negative for cold intolerance, heat intolerance, polydipsia, polyphagia and polyuria. Genitourinary:  Negative for decreased urine volume, difficulty urinating, dysuria, enuresis, flank pain, frequency, genital sores, hematuria, penile discharge, penile pain, penile swelling, scrotal swelling, testicular pain and urgency. Musculoskeletal:  Negative for arthralgias, back pain, gait problem, joint swelling, myalgias, neck pain and neck stiffness. Skin:  Negative for color change, pallor, rash and wound. Allergic/Immunologic: Negative for environmental allergies, food allergies and immunocompromised state. Neurological:  Negative for dizziness, tremors, seizures, syncope, facial asymmetry, speech difficulty, weakness, light-headedness, numbness and headaches. Hematological:  Negative for adenopathy. Does not bruise/bleed easily. Psychiatric/Behavioral:  Negative for agitation, behavioral problems, confusion, decreased concentration, dysphoric mood, hallucinations, self-injury, sleep disturbance and suicidal ideas. The patient is not nervous/anxious and is not hyperactive. Past Medical History:   Diagnosis Date    Anxiety     Asthma     as a child    Hypertension     PTSD (post-traumatic stress disorder)        Social History     Socioeconomic History    Marital status:      Spouse name: Not on file    Number of children: Not on file    Years of education: Not on file    Highest education level: Not on file   Occupational History    Not on file   Tobacco Use    Smoking status: Every Day     Packs/day: 1.00     Years: 15.00     Pack years: 15.00     Types: Cigarettes    Smokeless tobacco: Never   Substance and Sexual Activity    Alcohol use: No     Comment: none for past month    Drug use: No    Sexual activity: Not on file   Other Topics Concern    Not on file   Social History Narrative    Not on file     Social Determinants of Health     Financial Resource Strain: Low Risk     Difficulty of Paying Living Expenses: Not very hard   Food Insecurity: No Food Insecurity    Worried About Running Out of Food in the Last Year: Never true    Ran Out of Food in the Last Year: Never true   Transportation Needs: Not on file   Physical Activity: Not on file   Stress: Not on file   Social Connections: Not on file   Intimate Partner Violence: Not on file   Housing Stability: Not on file       Family History   Problem Relation Age of Onset    High Blood Pressure Father        Current Outpatient Medications on File Prior to Visit   Medication Sig Dispense Refill    montelukast (SINGULAIR) 10 MG tablet Take 1 tablet by mouth daily 30 tablet 5    gabapentin (NEURONTIN) 300 MG capsule Take 1 capsule by mouth 3 times daily for 30 days.  90 capsule 3    ibuprofen (ADVIL;MOTRIN) 800 MG tablet Take 1 tablet by mouth every 6 hours as needed for Pain 20 tablet 0     No current facility-administered medications on file prior to visit. Allergies   Allergen Reactions    Ativan [Lorazepam] Other (See Comments)     confusion       I have reviewed his allergies, medications, problem list, medical,social and family history and have updated as needed in the electronic medical record. Objective:     Physical Exam  Vitals and nursing note reviewed. Constitutional:       General: He is not in acute distress. Appearance: He is well-developed. He is not diaphoretic. HENT:      Head: Normocephalic and atraumatic. Right Ear: External ear normal.      Left Ear: External ear normal.      Nose: Nose normal.      Mouth/Throat:      Pharynx: No oropharyngeal exudate. Eyes:      General: No scleral icterus. Right eye: No discharge. Left eye: No discharge. Conjunctiva/sclera: Conjunctivae normal.      Pupils: Pupils are equal, round, and reactive to light. Neck:      Thyroid: No thyromegaly. Vascular: No JVD. Trachea: No tracheal deviation. Cardiovascular:      Rate and Rhythm: Normal rate and regular rhythm. Heart sounds: Normal heart sounds. No murmur heard. No friction rub. No gallop. Pulmonary:      Effort: Pulmonary effort is normal. No respiratory distress. Breath sounds: Normal breath sounds. No stridor. No wheezing or rales. Chest:      Chest wall: No tenderness. Abdominal:      General: Bowel sounds are normal. There is no distension. Palpations: Abdomen is soft. There is no mass. Tenderness: There is no abdominal tenderness. There is no guarding or rebound. Genitourinary:     Comments: Will follow with own gynecologist for gynecological and breast care. Musculoskeletal:         General: No tenderness. Normal range of motion. Cervical back: Normal range of motion and neck supple. Lymphadenopathy:      Cervical: No cervical adenopathy. Skin:     General: Skin is warm and dry. Coloration: Skin is not pale. Findings: No erythema or rash. Neurological:      Mental Status: He is alert and oriented to person, place, and time. Cranial Nerves: No cranial nerve deficit. Motor: No abnormal muscle tone. Coordination: Coordination normal.      Deep Tendon Reflexes: Reflexes are normal and symmetric. Reflexes normal.   Psychiatric:         Behavior: Behavior normal.         Thought Content: Thought content normal.         Judgment: Judgment normal.       Assessment / Plan:   Fareed Schaeffer was seen today for follow-up. Diagnoses and all orders for this visit:    Screening and evaluation for vasectomy  -     BRIGIDO Milner MD, Urology, [place]    Erectile dysfunction, unspecified erectile dysfunction type  -     sildenafil (REVATIO) 20 MG tablet; Take 1 tablet by mouth as needed (ed)  -     BRIGIDO Milner MD, Urology, [place]    Benign essential HTN  -     lisinopril (PRINIVIL;ZESTRIL) 10 MG tablet; Take 1 tablet by mouth daily    Essential hypertension    Mixed hyperlipidemia  -     Lipid Panel; Future    Acquired hypothyroidism  -     TSH; Future  -     T4, Free; Future    IFG (impaired fasting glucose)  -     Hemoglobin A1C; Future    Chronic fatigue  -     CBC; Future  -     Comprehensive Metabolic Panel; Future    Vitamin B 12 deficiency  -     Vitamin B12 & Folate; Future    Vitamin D deficiency  -     Vitamin D 25 Hydroxy; Future      Reviewed healthmaintenance report. Patient is aware of deficiencies and suggested preventative tests.

## 2022-09-26 NOTE — RESULT ENCOUNTER NOTE
Let him know his cholesterol is a little elevated and I would like to start a medication for this. Let me know if he is okay with this.

## 2022-10-05 RX ORDER — ROSUVASTATIN CALCIUM 5 MG/1
5 TABLET, COATED ORAL NIGHTLY
Qty: 90 TABLET | Refills: 3 | Status: SHIPPED | OUTPATIENT
Start: 2022-10-05

## 2022-10-21 ENCOUNTER — APPOINTMENT (OUTPATIENT)
Dept: GENERAL RADIOLOGY | Age: 37
End: 2022-10-21
Payer: COMMERCIAL

## 2022-10-21 ENCOUNTER — HOSPITAL ENCOUNTER (EMERGENCY)
Age: 37
Discharge: HOME | End: 2022-10-21
Payer: COMMERCIAL

## 2022-10-21 VITALS
OXYGEN SATURATION: 98 % | HEIGHT: 72 IN | RESPIRATION RATE: 16 BRPM | DIASTOLIC BLOOD PRESSURE: 65 MMHG | BODY MASS INDEX: 28.04 KG/M2 | SYSTOLIC BLOOD PRESSURE: 107 MMHG | TEMPERATURE: 98.6 F | WEIGHT: 207 LBS | HEART RATE: 83 BPM

## 2022-10-21 DIAGNOSIS — S62.663B OPEN NONDISPLACED FRACTURE OF DISTAL PHALANX OF LEFT MIDDLE FINGER, INITIAL ENCOUNTER: Primary | ICD-10-CM

## 2022-10-21 PROCEDURE — 99284 EMERGENCY DEPT VISIT MOD MDM: CPT

## 2022-10-21 PROCEDURE — 6370000000 HC RX 637 (ALT 250 FOR IP): Performed by: PHYSICIAN ASSISTANT

## 2022-10-21 PROCEDURE — 6360000002 HC RX W HCPCS: Performed by: PHYSICIAN ASSISTANT

## 2022-10-21 PROCEDURE — 2500000003 HC RX 250 WO HCPCS: Performed by: PHYSICIAN ASSISTANT

## 2022-10-21 PROCEDURE — 12002 RPR S/N/AX/GEN/TRNK2.6-7.5CM: CPT

## 2022-10-21 PROCEDURE — 96372 THER/PROPH/DIAG INJ SC/IM: CPT

## 2022-10-21 PROCEDURE — 73130 X-RAY EXAM OF HAND: CPT

## 2022-10-21 PROCEDURE — 90714 TD VACC NO PRESV 7 YRS+ IM: CPT | Performed by: PHYSICIAN ASSISTANT

## 2022-10-21 PROCEDURE — 90471 IMMUNIZATION ADMIN: CPT | Performed by: PHYSICIAN ASSISTANT

## 2022-10-21 RX ORDER — CEFAZOLIN SODIUM 1 G/3ML
1000 INJECTION, POWDER, FOR SOLUTION INTRAMUSCULAR; INTRAVENOUS ONCE
Status: COMPLETED | OUTPATIENT
Start: 2022-10-21 | End: 2022-10-21

## 2022-10-21 RX ORDER — HYDROCODONE BITARTRATE AND ACETAMINOPHEN 5; 325 MG/1; MG/1
1 TABLET ORAL ONCE
Status: COMPLETED | OUTPATIENT
Start: 2022-10-21 | End: 2022-10-21

## 2022-10-21 RX ORDER — LIDOCAINE HYDROCHLORIDE 10 MG/ML
5 INJECTION, SOLUTION INFILTRATION; PERINEURAL ONCE
Status: COMPLETED | OUTPATIENT
Start: 2022-10-21 | End: 2022-10-21

## 2022-10-21 RX ORDER — NAPROXEN 500 MG/1
500 TABLET ORAL 2 TIMES DAILY WITH MEALS
Qty: 20 TABLET | Refills: 0 | Status: SHIPPED | OUTPATIENT
Start: 2022-10-21

## 2022-10-21 RX ORDER — CEPHALEXIN 500 MG/1
500 CAPSULE ORAL 4 TIMES DAILY
Qty: 28 CAPSULE | Refills: 0 | Status: SHIPPED | OUTPATIENT
Start: 2022-10-21 | End: 2022-10-28

## 2022-10-21 RX ORDER — TETANUS AND DIPHTHERIA TOXOIDS ADSORBED 2; 2 [LF]/.5ML; [LF]/.5ML
0.5 INJECTION INTRAMUSCULAR ONCE
Status: COMPLETED | OUTPATIENT
Start: 2022-10-21 | End: 2022-10-21

## 2022-10-21 RX ADMIN — HYDROCODONE BITARTRATE AND ACETAMINOPHEN 1 TABLET: 5; 325 TABLET ORAL at 16:12

## 2022-10-21 RX ADMIN — LIDOCAINE HYDROCHLORIDE 5 ML: 10 INJECTION, SOLUTION INFILTRATION; PERINEURAL at 16:14

## 2022-10-21 RX ADMIN — CEFAZOLIN SODIUM 1000 MG: 1 INJECTION, POWDER, FOR SOLUTION INTRAMUSCULAR; INTRAVENOUS at 16:55

## 2022-10-21 RX ADMIN — TETANUS AND DIPHTHERIA TOXOIDS ADSORBED 0.5 ML: 2; 2 INJECTION INTRAMUSCULAR at 16:51

## 2022-10-21 ASSESSMENT — PAIN - FUNCTIONAL ASSESSMENT
PAIN_FUNCTIONAL_ASSESSMENT: 0-10
PAIN_FUNCTIONAL_ASSESSMENT: ACTIVITIES ARE NOT PREVENTED
PAIN_FUNCTIONAL_ASSESSMENT: PREVENTS OR INTERFERES SOME ACTIVE ACTIVITIES AND ADLS

## 2022-10-21 ASSESSMENT — PAIN SCALES - GENERAL
PAINLEVEL_OUTOF10: 7
PAINLEVEL_OUTOF10: 5

## 2022-10-21 ASSESSMENT — PAIN DESCRIPTION - DESCRIPTORS
DESCRIPTORS: ACHING
DESCRIPTORS: ACHING

## 2022-10-21 ASSESSMENT — LIFESTYLE VARIABLES
HOW MANY STANDARD DRINKS CONTAINING ALCOHOL DO YOU HAVE ON A TYPICAL DAY: PATIENT DOES NOT DRINK
HOW OFTEN DO YOU HAVE A DRINK CONTAINING ALCOHOL: NEVER

## 2022-10-21 ASSESSMENT — PAIN DESCRIPTION - FREQUENCY: FREQUENCY: CONTINUOUS

## 2022-10-21 ASSESSMENT — PAIN DESCRIPTION - PAIN TYPE: TYPE: ACUTE PAIN

## 2022-10-21 ASSESSMENT — PAIN DESCRIPTION - ORIENTATION
ORIENTATION: LEFT
ORIENTATION: LEFT

## 2022-10-21 ASSESSMENT — PAIN DESCRIPTION - ONSET: ONSET: ON-GOING

## 2022-10-21 ASSESSMENT — PAIN DESCRIPTION - LOCATION
LOCATION: FINGER (COMMENT WHICH ONE)
LOCATION: FINGER (COMMENT WHICH ONE)

## 2022-10-21 NOTE — ED PROVIDER NOTES
[facility]         Department of Emergency Medicine   ED  Provider Note  Admit Date/RoomTime: 10/21/2022  3:04 PM  ED Room: 08/08            HPI:  10/21/22, Time: 3:09 PM EDT  . oRb Becker is a 30 y.o. old male presenting to the emergency department for a laceration to the left 3rd digit, caused by smashed between car and car [place], which occured several minute(s) prior to arrival. There is not a possibility of retained foreign body in the affected area. The patients tetanus status is more than 5 years ago. Bleeding is  controlled. There is pain at injury site. The injury was not work related. Patient reports the 3-4 digits of left hand were smashed. No prior injury to this area. Review of Systems:   Pertinent positives and negatives are stated within HPI, all other systems reviewed and are negative.        --------------------------------------------- PAST HISTORY ---------------------------------------------  Past Medical History:  has a past medical history of Anxiety, Asthma, Hypertension, and PTSD (post-traumatic stress disorder). Past Surgical History:  has a past surgical history that includes knee surgery (Left); other surgical history (Left, 10/8/2015); and fracture surgery. Social History:  reports that he has been smoking cigarettes. He has a 15.00 pack-year smoking history. He has never used smokeless tobacco. He reports that he does not drink alcohol and does not use drugs. Family History: family history includes High Blood Pressure in his father. The patients home medications have been reviewed. Allergies: Ativan [lorazepam]    -------------------------------------------------- RESULTS -------------------------------------------------  All laboratory and radiology results have been personally reviewed by myself   LABS:  No results found for this visit on 10/21/22.     RADIOLOGY:  Interpreted by Radiologist.  XR HAND LEFT (MIN 3 VIEWS)   Final Result   Partial attending. Laceration #: 1. Location: left middle finger  Length: 3 cm. The wound area was cleansend with shur-clens and draped in a sterile fashion. The wound area was anesthetized with Lidocaine 1% without epinephrine with a digital block. WOUND COMPLEXITY:    Debridement: None. Undermining: None. Wound Margins Revised: None. Flaps Aligned: yes. The wound was explored with the following results no foreign body or tendon injury seen. The wound was closed with 5-0 Prolene using interrupted sutures. Dressing:  a bandage was placed. Total number suture: 10      Medical Decision Making:    Patient is a 79-year-old male presenting to the emergency department with a finger laceration after a crush type of injury. Found to have a distal third phalanx fracture which is nondisplaced. Wound was repaired and patient tolerated procedure well. Discussed wound care with the patient and his significant other. Ancef started in the ER as this is an open fracture. Tetanus updated. Patient will be discharged home with antibiotics and close orthopedic follow-up. Finger splinted prior to departure. Advised to return the emergency department with any new or worsening symptoms. Suture removal in 7 to 10 days. Patient and significant other voiced understanding and are agreeable to the above treatment plan. Counseling: The emergency provider has spoken with the patient and discussed todays results, in addition to providing specific details for the plan of care and counseling regarding the diagnosis and prognosis. Questions are answered at this time and they are agreeable with the plan.      --------------------------------- IMPRESSION AND DISPOSITION ---------------------------------    IMPRESSION  1.  Open nondisplaced fracture of distal phalanx of left middle finger, initial encounter        DISPOSITION  Disposition: Discharge to home  Patient condition is stable          Florentin Fernández  10/21/22 0411

## 2022-10-21 NOTE — DISCHARGE INSTRUCTIONS
Please return to the ED with new or worsening symptoms. Follow up with PCP for recheck. Suture removal in 7-10 days. Make sure to follow up with orthopaedics for finger fracture.

## 2022-10-31 ENCOUNTER — OFFICE VISIT (OUTPATIENT)
Dept: ORTHOPEDIC SURGERY | Age: 37
End: 2022-10-31
Payer: COMMERCIAL

## 2022-10-31 VITALS — RESPIRATION RATE: 20 BRPM | HEIGHT: 72 IN | BODY MASS INDEX: 28.04 KG/M2 | WEIGHT: 207 LBS

## 2022-10-31 DIAGNOSIS — S62.663A NONDISPLACED FRACTURE OF DISTAL PHALANX OF LEFT MIDDLE FINGER, INITIAL ENCOUNTER FOR CLOSED FRACTURE: ICD-10-CM

## 2022-10-31 DIAGNOSIS — S69.92XA INJURY OF FINGER OF LEFT HAND, INITIAL ENCOUNTER: Primary | ICD-10-CM

## 2022-10-31 PROCEDURE — 4004F PT TOBACCO SCREEN RCVD TLK: CPT | Performed by: ORTHOPAEDIC SURGERY

## 2022-10-31 PROCEDURE — G8427 DOCREV CUR MEDS BY ELIG CLIN: HCPCS | Performed by: ORTHOPAEDIC SURGERY

## 2022-10-31 PROCEDURE — G8484 FLU IMMUNIZE NO ADMIN: HCPCS | Performed by: ORTHOPAEDIC SURGERY

## 2022-10-31 PROCEDURE — 26750 TREAT FINGER FRACTURE EACH: CPT | Performed by: ORTHOPAEDIC SURGERY

## 2022-10-31 PROCEDURE — G8417 CALC BMI ABV UP PARAM F/U: HCPCS | Performed by: ORTHOPAEDIC SURGERY

## 2022-10-31 PROCEDURE — 99203 OFFICE O/P NEW LOW 30 MIN: CPT | Performed by: ORTHOPAEDIC SURGERY

## 2022-10-31 NOTE — PROGRESS NOTES
Department of Orthopedic Surgery  History and Physical      CHIEF COMPLAINT: Left middle finger injury    HISTORY OF PRESENT ILLNESS:                The patient is a 30 y.o. male who presents with injury to the left middle finger. On 10/21/2022 patient was working on a car at home when the car became dislodged rolled back and his finger got caught between the bumper and a houston. Pulled his hand out and noted the laceration to the middle finger and pain so he went to the emergency department. They updated his tetanus when he was in the emergency department. He had a laceration repair to the volar surface of the distal phalanx middle finger. He was placed on antibiotics. Said he took the antibiotics and just finished them recently. Has been compliant with wearing his splint. Pain controlled. Denies fever chills. Denies drainage. States he may have popped to the sutures out. But did not notice any bleeding or drainage at that time. Has been back to work. No other complaints this time    Past Medical History:        Diagnosis Date    Anxiety     Asthma     as a child    Hypertension     PTSD (post-traumatic stress disorder)      Past Surgical History:        Procedure Laterality Date    FRACTURE SURGERY      KNEE SURGERY Left     ACL repair, meniscus tear    OTHER SURGICAL HISTORY Left 10/8/2015    ORIF ulna     Current Medications:   No current facility-administered medications for this visit. Allergies:  Ativan [lorazepam]    Social History:   TOBACCO:   reports that he has been smoking cigarettes. He has a 15.00 pack-year smoking history. He has never used smokeless tobacco.  ETOH:   reports no history of alcohol use. DRUGS:   reports no history of drug use.   ACTIVITIES OF DAILY LIVING:    OCCUPATION:    Family History:       Problem Relation Age of Onset    High Blood Pressure Father        REVIEW OF SYSTEMS:  CONSTITUTIONAL:  negative  HEENT:  negative  RESPIRATORY:  negative  CARDIOVASCULAR: negative  GASTROINTESTINAL:  negative  INTEGUMENT/BREAST:  negative  HEMATOLOGIC/LYMPHATIC:  negative  ALLERGIC/IMMUNOLOGIC:  negative  ENDOCRINE:  negative  MUSCULOSKELETAL:  positive for  pain  NEUROLOGICAL:  positive for numbness    PHYSICAL EXAM:    VITALS:  Resp 20   Ht 6' (1.829 m)   Wt 207 lb (93.9 kg)   BMI 28.07 kg/m²   CONSTITUTIONAL:  awake, alert, cooperative, no apparent distress, and appears stated age  EYES:  Lids and lashes normal, pupils equal, round and reactive to light, extra ocular muscles intact, sclera clear, conjunctiva normal  ENT:  Normocephalic, without obvious abnormality, atraumatic, sinuses nontender on palpation, external ears without lesions, oral pharynx with moist mucus membranes, tonsils without erythema or exudates, gums normal and good dentition. NECK:  Supple, symmetrical, trachea midline, no adenopathy, thyroid symmetric, not enlarged and no tenderness, skin normal  LUNGS:  CTA  CARDIOVASCULAR:  2+ radial pulses, extremities warm and well perfused  ABDOMEN:   NTTP  CHEST:  Atraumatic   GENITAL/URINARY:  deferred  NEUROLOGIC:  Awake, alert, oriented to name, place and time. Cranial nerves II-XII are grossly intact. Motor is 5 out of 5 bilaterally. Sensory is intact.  gait is normal.  MUSCULOSKELETAL:    Left upper extremity  Incision overlying the volar surface distal phalanx, transverse in nature, sutures in place with no active drainage or signs of infection. There is a small subungual hematoma less than 10%. Otherwise skin intact circumferentially  Positive TTP about the distal phalanx, negative tender palpation elsewhere   flexion at the DIP present as is extension.   Comparments soft and compressible  +AIN/PIN/Ulnar/Median/Radial nerve function intact grossly  +2/4 Radial pulse, Cap refill <2sec  Distal sensation grossly intact to C4-T1 dermatomes, there is diminished sensation on the radial and ulnar border distal to the laceration but still present       DATA: CBC:   Lab Results   Component Value Date/Time    WBC 11.2 09/14/2022 04:37 PM    RBC 5.57 09/14/2022 04:37 PM    HGB 15.9 09/14/2022 04:37 PM    HCT 48.4 09/14/2022 04:37 PM    MCV 86.9 09/14/2022 04:37 PM    MCH 28.5 09/14/2022 04:37 PM    MCHC 32.9 09/14/2022 04:37 PM    RDW 13.5 09/14/2022 04:37 PM     09/14/2022 04:37 PM    MPV 11.2 09/14/2022 04:37 PM     PT/INR:  No results found for: PROTIME, INR    Radiology Review:  Xray: x-rays of the left hand were obtained at initial injury and reviewed with patient today. 3 views AP oblique and lateral demonstrate middle finger nondisplaced fracture through the distal phalanx short oblique. No other fractures dislocations noted  Impression: Nondisplaced fracture through the distal phalanx middle finger    IMPRESSION:  Left nondisplaced fractures through the distal phalanx middle finger  PTSD    PLAN:  Discussed treatment diagnosis patient. Incision is healing well the sutures were removed today. Advised patient that he needs to protect the incision and to look out for signs of infection. Placed into a new splint. Follow-up in 3 to 4 weeks for repeat x-rays and evaluation. I have seen and evaluated the patient and agree with the above assessment and plan on today's visit. I have performed the key components of the history and physical examination with significant findings of left middle finger distal phalanx fracture. This is nondisplaced. Stack splint was advised. Follow-up in 3 to 4 weeks for new x-rays. . I concur with the findings and plan as documented.     Stephanie Richey MD  10/31/2022

## 2022-11-17 ENCOUNTER — OFFICE VISIT (OUTPATIENT)
Dept: ORTHOPEDIC SURGERY | Age: 37
End: 2022-11-17

## 2022-11-17 VITALS — BODY MASS INDEX: 27.63 KG/M2 | WEIGHT: 204 LBS | HEIGHT: 72 IN

## 2022-11-17 DIAGNOSIS — S62.663A NONDISPLACED FRACTURE OF DISTAL PHALANX OF LEFT MIDDLE FINGER, INITIAL ENCOUNTER FOR CLOSED FRACTURE: Primary | ICD-10-CM

## 2022-11-17 PROCEDURE — 99024 POSTOP FOLLOW-UP VISIT: CPT | Performed by: ORTHOPAEDIC SURGERY

## 2022-11-17 NOTE — PROGRESS NOTES
Department of Orthopedic Surgery  History and Physical      CHIEF COMPLAINT: Left middle finger injury    HISTORY OF PRESENT ILLNESS:                The patient is a 30 y.o. male who presents with injury to the left middle finger. On 10/21/2022 patient was working on a car at home when the car became dislodged rolled back and his finger got caught between the bumper and a [place]. Pulled his hand out and noted the laceration to the middle finger and pain so he went to the emergency department. They updated his tetanus when he was in the emergency department. He had a laceration repair to the volar surface of the distal phalanx middle finger. He was placed on antibiotics. Said he took the antibiotics and just finished them recently. Has been compliant with wearing his splint. Pain controlled. Denies fever chills. Denies drainage. States he may have popped to the sutures out. But did not notice any bleeding or drainage at that time. Has been back to work. No other complaints this time    Patient has been wearing a splint. Pain has improved. He has noticed a laceration over the nail fold. No drainage. He has been working. Past Medical History:        Diagnosis Date    Anxiety     Asthma     as a child    Hypertension     PTSD (post-traumatic stress disorder)      Past Surgical History:        Procedure Laterality Date    FRACTURE SURGERY      KNEE SURGERY Left     ACL repair, meniscus tear    OTHER SURGICAL HISTORY Left 10/8/2015    ORIF ulna     Current Medications:   No current facility-administered medications for this visit. Allergies:  Ativan [lorazepam]    Social History:   TOBACCO:   reports that he has been smoking cigarettes. He has a 15.00 pack-year smoking history. He has never used smokeless tobacco.  ETOH:   reports no history of alcohol use. DRUGS:   reports no history of drug use.   ACTIVITIES OF DAILY LIVING:    OCCUPATION:    Family History:       Problem Relation Age of Onset    High Blood Pressure Father        REVIEW OF SYSTEMS:  CONSTITUTIONAL:  negative  HEENT:  negative  RESPIRATORY:  negative  CARDIOVASCULAR:  negative  GASTROINTESTINAL:  negative  INTEGUMENT/BREAST:  negative  HEMATOLOGIC/LYMPHATIC:  negative  ALLERGIC/IMMUNOLOGIC:  negative  ENDOCRINE:  negative  MUSCULOSKELETAL:  positive for pain  NEUROLOGICAL:  positive for numbness    PHYSICAL EXAM:    VITALS:  Ht 6' (1.829 m)   Wt 204 lb (92.5 kg)   BMI 27.67 kg/m²   CONSTITUTIONAL:  awake, alert, cooperative, no apparent distress, and appears stated age  EYES:  Lids and lashes normal, pupils equal, round and reactive to light, extra ocular muscles intact, sclera clear, conjunctiva normal  ENT:  Normocephalic, without obvious abnormality, atraumatic, sinuses nontender on palpation, external ears without lesions, oral pharynx with moist mucus membranes, tonsils without erythema or exudates, gums normal and good dentition. NECK:  Supple, symmetrical, trachea midline, no adenopathy, thyroid symmetric, not enlarged and no tenderness, skin normal  NEUROLOGIC:  Awake, alert, oriented to name, place and time. Cranial nerves II-XII are grossly intact. Motor is 5 out of 5 bilaterally. Sensory is intact.  gait is normal.  MUSCULOSKELETAL:    Left upper extremity  Incision overlying the volar surface distal phalanx, transverse in nature, healing well. No erythema or signs of infection. It does appear that a new nail is growing.   Otherwise skin intact circumferentially  Positive TTP about the distal phalanx, negative tender palpation elsewhere   Near full flexion at the MCP and PIP joint of the middle finger  Comparments soft and compressible  +AIN/PIN/Ulnar/Median/Radial nerve function intact grossly  +2/4 Radial pulse, Cap refill <2sec  Distal sensation grossly intact to C4-T1 dermatomes, there is diminished sensation on the radial and ulnar border distal to the laceration but still present       DATA:    CBC:   Lab Results   Component Value Date/Time    WBC 11.2 09/14/2022 04:37 PM    RBC 5.57 09/14/2022 04:37 PM    HGB 15.9 09/14/2022 04:37 PM    HCT 48.4 09/14/2022 04:37 PM    MCV 86.9 09/14/2022 04:37 PM    MCH 28.5 09/14/2022 04:37 PM    MCHC 32.9 09/14/2022 04:37 PM    RDW 13.5 09/14/2022 04:37 PM     09/14/2022 04:37 PM    MPV 11.2 09/14/2022 04:37 PM     PT/INR:  No results found for: PROTIME, INR    Radiology Review:  Xray: x-rays of the left hand were obtained today in the office and reviewed with the patient and compared to xrays from 10/21/22.  3 views AP oblique and lateral demonstrate middle finger nondisplaced fracture through the distal phalanx short oblique. No other fractures dislocations noted  Impression: Nondisplaced fracture through the distal phalanx middle finger    IMPRESSION:  Open, left nondisplaced fracture through the distal phalanx middle finger  PTSD    PLAN:  Discussed findings with patient. Recommended continued splinting of the fracture with keeping the MCP and PIP joint free and able to flex and extend as tolerated. Activity restrictions reviewed with the patient. Patient follow-up in 4 weeks for repeat x-rays. All questions answered. I have seen and evaluated the patient and agree with the above assessment and plan on today's visit. I have performed the key components of the history and physical examination with significant findings of improving middle finger distal phalanx fracture. . I concur with the findings and plan as documented.     Bo Rowan MD  11/17/2022

## 2022-12-19 ENCOUNTER — OFFICE VISIT (OUTPATIENT)
Dept: ORTHOPEDIC SURGERY | Age: 37
End: 2022-12-19

## 2022-12-19 VITALS — WEIGHT: 204 LBS | RESPIRATION RATE: 20 BRPM | BODY MASS INDEX: 27.63 KG/M2 | HEIGHT: 72 IN

## 2022-12-19 DIAGNOSIS — S69.92XA INJURY OF FINGER OF LEFT HAND, INITIAL ENCOUNTER: Primary | ICD-10-CM

## 2022-12-19 PROCEDURE — 99024 POSTOP FOLLOW-UP VISIT: CPT | Performed by: ORTHOPAEDIC SURGERY

## 2022-12-19 NOTE — PROGRESS NOTES
Department of Orthopedic Surgery  History and Physical      CHIEF COMPLAINT: Left middle finger injury    HISTORY OF PRESENT ILLNESS:                The patient is a 30 y.o. male who presents with injury to the left middle finger. On 10/21/2022 patient was working on a car at home when the car became dislodged rolled back and his finger got caught between the bumper and a [place]. Pulled his hand out and noted the laceration to the middle finger and pain so he went to the emergency department. They updated his tetanus when he was in the emergency department. He had a laceration repair to the volar surface of the distal phalanx middle finger. He was placed on antibiotics. Said he took the antibiotics and just finished them recently. Has been compliant with wearing his splint. Pain controlled. Denies fever chills. Denies drainage. States he may have popped to the sutures out. But did not notice any bleeding or drainage at that time. Has been back to work. No other complaints this time    Patient has been wearing a splint. He has been working as a . He reports his pain has significantly diminished since his last visit. Past Medical History:        Diagnosis Date    Anxiety     Asthma     as a child    Hypertension     PTSD (post-traumatic stress disorder)      Past Surgical History:        Procedure Laterality Date    FRACTURE SURGERY      KNEE SURGERY Left     ACL repair, meniscus tear    OTHER SURGICAL HISTORY Left 10/8/2015    ORIF ulna     Current Medications:   No current facility-administered medications for this visit. Allergies:  Ativan [lorazepam]    Social History:   TOBACCO:   reports that he has been smoking cigarettes. He has a 15.00 pack-year smoking history. He has never used smokeless tobacco.  ETOH:   reports no history of alcohol use. DRUGS:   reports no history of drug use.   ACTIVITIES OF DAILY LIVING:    OCCUPATION:    Family History:       Problem Relation Age of Onset    High Blood Pressure Father        REVIEW OF SYSTEMS:  CONSTITUTIONAL:  negative  HEENT:  negative  RESPIRATORY:  negative  CARDIOVASCULAR:  negative  GASTROINTESTINAL:  negative  INTEGUMENT/BREAST:  negative  HEMATOLOGIC/LYMPHATIC:  negative  ALLERGIC/IMMUNOLOGIC:  negative  ENDOCRINE:  negative  MUSCULOSKELETAL:  positive for pain  NEUROLOGICAL:  positive for numbness    PHYSICAL EXAM:    VITALS:  Resp 20   Ht 6' (1.829 m)   Wt 204 lb (92.5 kg)   BMI 27.67 kg/m²   CONSTITUTIONAL:  awake, alert, cooperative, no apparent distress, and appears stated age  EYES:  Lids and lashes normal, pupils equal, round and reactive to light, extra ocular muscles intact, sclera clear, conjunctiva normal  ENT:  Normocephalic, without obvious abnormality, atraumatic, sinuses nontender on palpation, external ears without lesions, oral pharynx with moist mucus membranes, tonsils without erythema or exudates, gums normal and good dentition. NECK:  Supple, symmetrical, trachea midline, no adenopathy, thyroid symmetric, not enlarged and no tenderness, skin normal  NEUROLOGIC:  Awake, alert, oriented to name, place and time. Cranial nerves II-XII are grossly intact. Motor is 5 out of 5 bilaterally. Sensory is intact.  gait is normal.  MUSCULOSKELETAL:    Left upper extremity  Laceration healing well.    Minimal TTP about the distal phalanx, negative tender palpation elsewhere   Near full flexion at the MCP and PIP joint of the middle finger  Comparments soft and compressible  +AIN/PIN/Ulnar/Median/Radial nerve function intact grossly  +2/4 Radial pulse, Cap refill <2sec  Distal sensation grossly intact to C4-T1 dermatomes, there is diminished sensation on the radial and ulnar border distal to the laceration but still present       DATA:    CBC:   Lab Results   Component Value Date/Time    WBC 11.2 09/14/2022 04:37 PM    RBC 5.57 09/14/2022 04:37 PM    HGB 15.9 09/14/2022 04:37 PM    HCT 48.4 09/14/2022 04:37 PM    MCV 86.9 09/14/2022 04:37 PM    MCH 28.5 09/14/2022 04:37 PM    MCHC 32.9 09/14/2022 04:37 PM    RDW 13.5 09/14/2022 04:37 PM     09/14/2022 04:37 PM    MPV 11.2 09/14/2022 04:37 PM     PT/INR:  No results found for: PROTIME, INR    Radiology Review:  Xray: x-rays of the left hand were obtained today in the office and reviewed with the patient and compared to xrays from 11/17/22.  3 views:  AP oblique and lateral demonstrate middle finger nondisplaced fracture through the distal phalanx short oblique. Minimal callus formation present. No other fractures dislocations noted  Impression: Nondisplaced fracture through the distal phalanx middle finger    IMPRESSION:  Open, left nondisplaced fracture through the distal phalanx middle finger  PTSD    PLAN:  Continue splint. Did discuss clinically he is doing well. His fracture remains with lucency present on xrays. Activity restrictions reviewed with the patient. Patient to continue splint until his follow up in 3 weeks for reevaluation. All questions answered. I have seen and evaluated the patient and agree with the above assessment and plan on today's visit. I have performed the key components of the history and physical examination with significant findings of distal phalanx fracture doing well. I concur with the findings and plan as documented.     Susan Perez MD  12/19/2022

## 2023-01-11 ENCOUNTER — TELEPHONE (OUTPATIENT)
Dept: ORTHOPEDIC SURGERY | Age: 38
End: 2023-01-11

## 2023-01-11 DIAGNOSIS — S62.663A NONDISPLACED FRACTURE OF DISTAL PHALANX OF LEFT MIDDLE FINGER, INITIAL ENCOUNTER FOR CLOSED FRACTURE: Primary | ICD-10-CM

## 2023-01-12 ENCOUNTER — OFFICE VISIT (OUTPATIENT)
Dept: ORTHOPEDIC SURGERY | Age: 38
End: 2023-01-12
Payer: COMMERCIAL

## 2023-01-12 VITALS — BODY MASS INDEX: 27.63 KG/M2 | HEIGHT: 72 IN | WEIGHT: 204 LBS

## 2023-01-12 DIAGNOSIS — S62.663A NONDISPLACED FRACTURE OF DISTAL PHALANX OF LEFT MIDDLE FINGER, INITIAL ENCOUNTER FOR CLOSED FRACTURE: Primary | ICD-10-CM

## 2023-01-12 PROCEDURE — 4004F PT TOBACCO SCREEN RCVD TLK: CPT | Performed by: ORTHOPAEDIC SURGERY

## 2023-01-12 PROCEDURE — G8484 FLU IMMUNIZE NO ADMIN: HCPCS | Performed by: ORTHOPAEDIC SURGERY

## 2023-01-12 PROCEDURE — G8417 CALC BMI ABV UP PARAM F/U: HCPCS | Performed by: ORTHOPAEDIC SURGERY

## 2023-01-12 PROCEDURE — 99212 OFFICE O/P EST SF 10 MIN: CPT | Performed by: ORTHOPAEDIC SURGERY

## 2023-01-12 PROCEDURE — G8427 DOCREV CUR MEDS BY ELIG CLIN: HCPCS | Performed by: ORTHOPAEDIC SURGERY

## 2023-01-12 NOTE — PROGRESS NOTES
Department of Orthopedic Surgery  History and Physical      CHIEF COMPLAINT: Left middle finger injury    HISTORY OF PRESENT ILLNESS:                The patient is a 30 y.o. male who presents with injury to the left middle finger. On 10/21/2022 patient was working on a car at home when the car became dislodged rolled back and his finger got caught between the bumper and a [place]. Pulled his hand out and noted the laceration to the middle finger and pain so he went to the emergency department. They updated his tetanus when he was in the emergency department. He had a laceration repair to the volar surface of the distal phalanx middle finger. He was placed on antibiotics. Said he took the antibiotics and just finished them recently. Has been compliant with wearing his splint. Pain controlled. Denies fever chills. Denies drainage. States he may have popped to the sutures out. But did not notice any bleeding or drainage at that time. Has been back to work. No other complaints this time    He is now about 10 weeks out from his injury. He reports no pain in the finger. However he has been wearing a splint. Past Medical History:        Diagnosis Date    Anxiety     Asthma     as a child    Hypertension     PTSD (post-traumatic stress disorder)      Past Surgical History:        Procedure Laterality Date    FRACTURE SURGERY      KNEE SURGERY Left     ACL repair, meniscus tear    OTHER SURGICAL HISTORY Left 10/8/2015    ORIF ulna     Current Medications:   No current facility-administered medications for this visit. Allergies:  Ativan [lorazepam]    Social History:   TOBACCO:   reports that he has been smoking cigarettes. He has a 15.00 pack-year smoking history. He has never used smokeless tobacco.  ETOH:   reports no history of alcohol use. DRUGS:   reports no history of drug use.   ACTIVITIES OF DAILY LIVING:    OCCUPATION:    Family History:       Problem Relation Age of Onset    High Blood Pressure Father        REVIEW OF SYSTEMS:  CONSTITUTIONAL:  negative  HEENT:  negative  RESPIRATORY:  negative  CARDIOVASCULAR:  negative  GASTROINTESTINAL:  negative  INTEGUMENT/BREAST:  negative  HEMATOLOGIC/LYMPHATIC:  negative  ALLERGIC/IMMUNOLOGIC:  negative  ENDOCRINE:  negative  MUSCULOSKELETAL:  positive for pain  NEUROLOGICAL:  positive for numbness    PHYSICAL EXAM:    VITALS:  Ht 6' (1.829 m)   Wt 204 lb (92.5 kg)   BMI 27.67 kg/m²   CONSTITUTIONAL:  awake, alert, cooperative, no apparent distress, and appears stated age  EYES:  Lids and lashes normal, pupils equal, round and reactive to light, extra ocular muscles intact, sclera clear, conjunctiva normal  ENT:  Normocephalic, without obvious abnormality, atraumatic, sinuses nontender on palpation, external ears without lesions, oral pharynx with moist mucus membranes, tonsils without erythema or exudates, gums normal and good dentition. NECK:  Supple, symmetrical, trachea midline, no adenopathy, thyroid symmetric, not enlarged and no tenderness, skin normal  NEUROLOGIC:  Awake, alert, oriented to name, place and time. Cranial nerves II-XII are grossly intact. Motor is 5 out of 5 bilaterally. Sensory is intact.  gait is normal.  MUSCULOSKELETAL:    Left upper extremity  Minimal TTP about the distal phalanx, negative tender palpation elsewhere. There is new nail growth present.   No signs of infection  Near full flexion at the MCP and PIP joint of the middle finger  Comparments soft and compressible  +AIN/PIN/Ulnar/Median/Radial nerve function intact grossly  +2/4 Radial pulse, Cap refill <2sec  Distal sensation grossly intact to C4-T1 dermatomes, there is diminished sensation on the radial and ulnar border distal to the laceration but still present       DATA:    CBC:   Lab Results   Component Value Date/Time    WBC 11.2 09/14/2022 04:37 PM    RBC 5.57 09/14/2022 04:37 PM    HGB 15.9 09/14/2022 04:37 PM    HCT 48.4 09/14/2022 04:37 PM    MCV 86.9 09/14/2022 04:37 PM    MCH 28.5 09/14/2022 04:37 PM    MCHC 32.9 09/14/2022 04:37 PM    RDW 13.5 09/14/2022 04:37 PM     09/14/2022 04:37 PM    MPV 11.2 09/14/2022 04:37 PM     PT/INR:  No results found for: PROTIME, INR    Radiology Review:  Xray: x-rays of the left hand were obtained today in the office and reviewed with the patient and compared to xrays from 12/19/2022 and 11/17/22.  3 views:  AP oblique and lateral demonstrate middle finger nondisplaced fracture through the distal phalanx short oblique. Progressivel callus formation present. No other fractures dislocations noted  Impression: Nondisplaced fracture through the distal phalanx middle finger with callus formation    IMPRESSION:  Open, left nondisplaced fracture through the distal phalanx middle finger  PTSD    PLAN:  Findings were explained. We will discontinue his splint at this time. He may follow-up in 2 to 3 months for new x-rays or sooner if needed.

## 2023-03-13 ENCOUNTER — TELEPHONE (OUTPATIENT)
Dept: FAMILY MEDICINE CLINIC | Age: 38
End: 2023-03-13

## 2023-03-13 ENCOUNTER — OFFICE VISIT (OUTPATIENT)
Dept: FAMILY MEDICINE CLINIC | Age: 38
End: 2023-03-13
Payer: COMMERCIAL

## 2023-03-13 VITALS
HEIGHT: 72 IN | BODY MASS INDEX: 27.63 KG/M2 | WEIGHT: 204 LBS | DIASTOLIC BLOOD PRESSURE: 78 MMHG | OXYGEN SATURATION: 99 % | SYSTOLIC BLOOD PRESSURE: 136 MMHG | HEART RATE: 97 BPM | TEMPERATURE: 98.8 F

## 2023-03-13 DIAGNOSIS — J40 BRONCHITIS: Primary | ICD-10-CM

## 2023-03-13 PROCEDURE — G8427 DOCREV CUR MEDS BY ELIG CLIN: HCPCS

## 2023-03-13 PROCEDURE — 4004F PT TOBACCO SCREEN RCVD TLK: CPT

## 2023-03-13 PROCEDURE — G8417 CALC BMI ABV UP PARAM F/U: HCPCS

## 2023-03-13 PROCEDURE — G8484 FLU IMMUNIZE NO ADMIN: HCPCS

## 2023-03-13 PROCEDURE — 99213 OFFICE O/P EST LOW 20 MIN: CPT

## 2023-03-13 RX ORDER — AMOXICILLIN AND CLAVULANATE POTASSIUM 875; 125 MG/1; MG/1
1 TABLET, FILM COATED ORAL 2 TIMES DAILY
Qty: 20 TABLET | Refills: 0 | Status: SHIPPED | OUTPATIENT
Start: 2023-03-13 | End: 2023-03-23

## 2023-03-13 RX ORDER — ALBUTEROL SULFATE 90 UG/1
AEROSOL, METERED RESPIRATORY (INHALATION)
Qty: 1 EACH | Refills: 0 | Status: SHIPPED | OUTPATIENT
Start: 2023-03-13

## 2023-03-13 RX ORDER — PREDNISONE 10 MG/1
10 TABLET ORAL DAILY
Qty: 7 TABLET | Refills: 0 | Status: SHIPPED | OUTPATIENT
Start: 2023-03-13 | End: 2023-03-20

## 2023-03-13 SDOH — ECONOMIC STABILITY: HOUSING INSECURITY
IN THE LAST 12 MONTHS, WAS THERE A TIME WHEN YOU DID NOT HAVE A STEADY PLACE TO SLEEP OR SLEPT IN A SHELTER (INCLUDING NOW)?: NO

## 2023-03-13 SDOH — ECONOMIC STABILITY: INCOME INSECURITY: HOW HARD IS IT FOR YOU TO PAY FOR THE VERY BASICS LIKE FOOD, HOUSING, MEDICAL CARE, AND HEATING?: NOT HARD AT ALL

## 2023-03-13 SDOH — ECONOMIC STABILITY: FOOD INSECURITY: WITHIN THE PAST 12 MONTHS, YOU WORRIED THAT YOUR FOOD WOULD RUN OUT BEFORE YOU GOT MONEY TO BUY MORE.: NEVER TRUE

## 2023-03-13 SDOH — ECONOMIC STABILITY: FOOD INSECURITY: WITHIN THE PAST 12 MONTHS, THE FOOD YOU BOUGHT JUST DIDN'T LAST AND YOU DIDN'T HAVE MONEY TO GET MORE.: NEVER TRUE

## 2023-03-13 ASSESSMENT — PATIENT HEALTH QUESTIONNAIRE - PHQ9
SUM OF ALL RESPONSES TO PHQ QUESTIONS 1-9: 0
1. LITTLE INTEREST OR PLEASURE IN DOING THINGS: 0
SUM OF ALL RESPONSES TO PHQ QUESTIONS 1-9: 0
SUM OF ALL RESPONSES TO PHQ9 QUESTIONS 1 & 2: 0
SUM OF ALL RESPONSES TO PHQ QUESTIONS 1-9: 0
DEPRESSION UNABLE TO ASSESS: FUNCTIONAL CAPACITY MOTIVATION LIMITS ACCURACY
2. FEELING DOWN, DEPRESSED OR HOPELESS: 0
SUM OF ALL RESPONSES TO PHQ QUESTIONS 1-9: 0

## 2023-03-13 NOTE — TELEPHONE ENCOUNTER
Patient called stating he thinks he may have a sinus infection and asked if he could make an appt with Dr. Latanya Hills today. I informed that Dr. Latanya Hills does not have an appt and advised patient to go to the [address]. Patient was agreeable.      Last seen 9/14/2022  Next appt Visit date not found

## 2023-03-13 NOTE — PROGRESS NOTES
3/13/23  Jaydon Ruth : 1985 Sex: male  Age 30 y.o. Subjective:  Chief Complaint   Patient presents with    Cough     Sinus congestion and drainage, fever , body aches, chest congestion started sat       HPI:   Jaydon Ruth , 30 y.o. male presents to the clinic for evaluation of cough x 3 days. The patient also reports sinus congestion/drainage, fever and body aches. The patient has taken Sudafed for symptoms. The patient reports worsening symptoms over time. The patient has ill exposure, through child. The patient denies hx of COVID-19. The patient denies acute loss of taste and smell, headache, sore throat, rash, and fever. The patient also denies chest pain, abdominal pain, shortness of breath, wheezing, and nausea / vomiting / diarrhea. ROS:   Unless otherwise stated in this report the patient's positive and negative responses for review of systems for constitutional, eyes, ENT, cardiovascular, respiratory, gastrointestinal, neurological, , musculoskeletal, and integument systems and related systems to the presenting problem are either stated in the history of present illness or were not pertinent or were negative for the symptoms and/or complaints related to the presenting medical problem. Positives and pertinent negatives as per HPI. All others reviewed and are negative.       PMH:     Past Medical History:   Diagnosis Date    Anxiety     Asthma     as a child    Hypertension     PTSD (post-traumatic stress disorder)        Past Surgical History:   Procedure Laterality Date    FRACTURE SURGERY      KNEE SURGERY Left     ACL repair, meniscus tear    OTHER SURGICAL HISTORY Left 10/8/2015    ORIF ulna       Family History   Problem Relation Age of Onset    High Blood Pressure Father        Medications:     Current Outpatient Medications:     amoxicillin-clavulanate (AUGMENTIN) 875-125 MG per tablet, Take 1 tablet by mouth 2 times daily for 10 days, Disp: 20 tablet, Rfl: 0    albuterol sulfate HFA (PROVENTIL;VENTOLIN;PROAIR) 108 (90 Base) MCG/ACT inhaler, 2 puffs 4 times daily x 5 days and every 4 hours as needed for shortness of breath or wheezing., Disp: 1 each, Rfl: 0    predniSONE (DELTASONE) 10 MG tablet, Take 1 tablet by mouth daily for 7 days, Disp: 7 tablet, Rfl: 0    naproxen (NAPROSYN) 500 MG tablet, Take 1 tablet by mouth 2 times daily (with meals), Disp: 20 tablet, Rfl: 0    rosuvastatin (CRESTOR) 5 MG tablet, Take 1 tablet by mouth nightly, Disp: 90 tablet, Rfl: 3    sildenafil (REVATIO) 20 MG tablet, Take 1 tablet by mouth as needed (ed), Disp: 30 tablet, Rfl: 5    lisinopril (PRINIVIL;ZESTRIL) 10 MG tablet, Take 1 tablet by mouth daily, Disp: 90 tablet, Rfl: 5    ibuprofen (ADVIL;MOTRIN) 800 MG tablet, Take 1 tablet by mouth every 6 hours as needed for Pain, Disp: 20 tablet, Rfl: 0    Allergies:     Allergies   Allergen Reactions    Ativan [Lorazepam] Other (See Comments)     confusion       Social History:     Social History     Tobacco Use    Smoking status: Every Day     Packs/day: 1.00     Years: 15.00     Pack years: 15.00     Types: Cigarettes    Smokeless tobacco: Never   Substance Use Topics    Alcohol use: No     Comment: none for past month    Drug use: No       Physical Exam:     Vitals:    03/13/23 1612   BP: 136/78   Pulse: 97   Temp: 98.8 °F (37.1 °C)   TempSrc: Temporal   SpO2: 99%   Weight: 204 lb (92.5 kg)   Height: 6' (1.829 m)       Physical Exam (PE)    Physical Exam  Vitals and nursing note reviewed.   Constitutional:       Appearance: He is well-developed.   HENT:      Head: Normocephalic and atraumatic.      Right Ear: Hearing and external ear normal. A middle ear effusion is present.      Left Ear: Hearing and external ear normal. A middle ear effusion is present.      Nose: Congestion present.      Mouth/Throat:      Pharynx: Posterior oropharyngeal erythema present.      Comments: Post nasal drip   Eyes:      Pupils: Pupils are equal, round, and reactive to  light. Cardiovascular:      Rate and Rhythm: Normal rate and regular rhythm. Heart sounds: Normal heart sounds. No murmur heard. Pulmonary:      Effort: Pulmonary effort is normal. No respiratory distress. Breath sounds: Examination of the right-middle field reveals wheezing. Examination of the left-lower field reveals rhonchi. Wheezing and rhonchi present. No rales. Abdominal:      General: Bowel sounds are normal.      Palpations: Abdomen is soft. Tenderness: There is no abdominal tenderness. There is no guarding or rebound. Musculoskeletal:      Cervical back: Normal range of motion and neck supple. Skin:     General: Skin is warm and dry. Neurological:      Mental Status: He is alert and oriented to person, place, and time. Cranial Nerves: No cranial nerve deficit. Coordination: Coordination normal.        Testing:   (All laboratory and radiology results have been personally reviewed by myself)  Labs:  No results found for this visit on 03/13/23. Imaging: All Radiology results interpreted by Radiologist unless otherwise noted. No orders to display       Assessment / Plan:   The patient's vitals, allergies, medications, and past medical history have been reviewed. Kimi Johnson was seen today for cough. Diagnoses and all orders for this visit:    Bronchitis    Other orders  -     amoxicillin-clavulanate (AUGMENTIN) 875-125 MG per tablet; Take 1 tablet by mouth 2 times daily for 10 days  -     albuterol sulfate HFA (PROVENTIL;VENTOLIN;PROAIR) 108 (90 Base) MCG/ACT inhaler; 2 puffs 4 times daily x 5 days and every 4 hours as needed for shortness of breath or wheezing.  -     predniSONE (DELTASONE) 10 MG tablet; Take 1 tablet by mouth daily for 7 days      - Disposition: Home    - Educational material printed for patient's review and were included in patient instructions. After Visit Summary was given to patient at the end of visit.     - COVID-19 swab obtained and negative, Influenza swab obtained and negative. Encouraged oral fluids and rest. Discussed symptomatic treatments with patient today. The patient is to schedule a follow-up with PCP in the next 2-3 days for reevaluation. Red flag symptoms were also discussed with the patient today. If symptoms worsen the patient is to go directly to the emergency department for reevaluation and treatment. Pt verbalizes understanding and is in agreement with plan of care. All questions answered. SIGNATURE: Lorraine Garcia, JAYA - CNP      *NOTE: This report was transcribed using voice recognition software. Every effort was made to ensure accuracy; however, inadvertent computerized transcription errors may be present.

## 2023-04-06 RX ORDER — ALBUTEROL SULFATE 90 UG/1
AEROSOL, METERED RESPIRATORY (INHALATION)
Qty: 6.7 EACH | OUTPATIENT
Start: 2023-04-06

## 2023-07-27 ENCOUNTER — TELEPHONE (OUTPATIENT)
Dept: FAMILY MEDICINE CLINIC | Age: 38
End: 2023-07-27

## 2023-07-27 NOTE — TELEPHONE ENCOUNTER
Left message. I will call him back again today. I need to know what insurance he had for his visit on 03/13/23/. Two listed in registration. The one maybe a workers comp. I called the insurance company [employer] verified coverage effective from 09/01/21. Ref# [id number]. Primary coverage    Called patient back to let him know I have the information.

## 2023-11-04 ENCOUNTER — HOSPITAL ENCOUNTER (EMERGENCY)
Age: 38
Discharge: HOME | End: 2023-11-04
Attending: EMERGENCY MEDICINE
Payer: COMMERCIAL

## 2023-11-04 ENCOUNTER — APPOINTMENT (OUTPATIENT)
Dept: CT IMAGING | Age: 38
End: 2023-11-04
Payer: COMMERCIAL

## 2023-11-04 VITALS
TEMPERATURE: 98.4 F | DIASTOLIC BLOOD PRESSURE: 80 MMHG | HEART RATE: 80 BPM | BODY MASS INDEX: 30.15 KG/M2 | WEIGHT: 222.3 LBS | RESPIRATION RATE: 20 BRPM | OXYGEN SATURATION: 96 % | SYSTOLIC BLOOD PRESSURE: 128 MMHG

## 2023-11-04 DIAGNOSIS — S06.0X0A CONCUSSION WITHOUT LOSS OF CONSCIOUSNESS, INITIAL ENCOUNTER: ICD-10-CM

## 2023-11-04 DIAGNOSIS — S09.90XA INJURY OF HEAD, INITIAL ENCOUNTER: Primary | ICD-10-CM

## 2023-11-04 PROCEDURE — 72125 CT NECK SPINE W/O DYE: CPT

## 2023-11-04 PROCEDURE — 70450 CT HEAD/BRAIN W/O DYE: CPT

## 2023-11-04 PROCEDURE — 6370000000 HC RX 637 (ALT 250 FOR IP): Performed by: EMERGENCY MEDICINE

## 2023-11-04 PROCEDURE — 99284 EMERGENCY DEPT VISIT MOD MDM: CPT

## 2023-11-04 RX ORDER — CEPHALEXIN 500 MG/1
500 CAPSULE ORAL 3 TIMES DAILY
Qty: 21 CAPSULE | Refills: 0 | Status: SHIPPED | OUTPATIENT
Start: 2023-11-04 | End: 2023-11-04 | Stop reason: SDUPTHER

## 2023-11-04 RX ORDER — CEPHALEXIN 500 MG/1
500 CAPSULE ORAL ONCE
Status: COMPLETED | OUTPATIENT
Start: 2023-11-04 | End: 2023-11-04

## 2023-11-04 RX ORDER — BACITRACIN ZINC 500 [USP'U]/G
OINTMENT TOPICAL ONCE
Status: COMPLETED | OUTPATIENT
Start: 2023-11-04 | End: 2023-11-04

## 2023-11-04 RX ORDER — CEPHALEXIN 500 MG/1
500 CAPSULE ORAL 3 TIMES DAILY
Qty: 21 CAPSULE | Refills: 0 | Status: SHIPPED | OUTPATIENT
Start: 2023-11-04 | End: 2023-11-11

## 2023-11-04 RX ADMIN — CEPHALEXIN 500 MG: 500 CAPSULE ORAL at 23:20

## 2023-11-04 RX ADMIN — BACITRACIN ZINC: 500 OINTMENT TOPICAL at 23:22

## 2023-11-04 ASSESSMENT — PAIN - FUNCTIONAL ASSESSMENT: PAIN_FUNCTIONAL_ASSESSMENT: NONE - DENIES PAIN

## 2023-11-05 NOTE — ED NOTES
Puncture Wound to left side cleansed with warm water & washcloth, bacitracin applied, nonadherent applied     Rebecca Henriquez  11/04/23 0569

## 2024-04-04 ENCOUNTER — OFFICE VISIT (OUTPATIENT)
Dept: FAMILY MEDICINE CLINIC | Age: 39
End: 2024-04-04
Payer: COMMERCIAL

## 2024-04-04 VITALS
SYSTOLIC BLOOD PRESSURE: 126 MMHG | DIASTOLIC BLOOD PRESSURE: 80 MMHG | TEMPERATURE: 98.8 F | HEART RATE: 97 BPM | WEIGHT: 221.6 LBS | OXYGEN SATURATION: 99 % | BODY MASS INDEX: 30.02 KG/M2 | HEIGHT: 72 IN

## 2024-04-04 DIAGNOSIS — K12.2 ORAL INFECTION: Primary | ICD-10-CM

## 2024-04-04 DIAGNOSIS — K12.0 APHTHOUS ULCER: ICD-10-CM

## 2024-04-04 PROCEDURE — 99213 OFFICE O/P EST LOW 20 MIN: CPT

## 2024-04-04 RX ORDER — AMOXICILLIN AND CLAVULANATE POTASSIUM 875; 125 MG/1; MG/1
1 TABLET, FILM COATED ORAL 2 TIMES DAILY
Qty: 20 TABLET | Refills: 0 | Status: SHIPPED | OUTPATIENT
Start: 2024-04-04 | End: 2024-04-14

## 2024-04-04 RX ORDER — PREDNISONE 10 MG/1
TABLET ORAL
Qty: 18 TABLET | Refills: 0 | Status: SHIPPED | OUTPATIENT
Start: 2024-04-04 | End: 2024-04-12

## 2024-04-04 SDOH — ECONOMIC STABILITY: FOOD INSECURITY: WITHIN THE PAST 12 MONTHS, YOU WORRIED THAT YOUR FOOD WOULD RUN OUT BEFORE YOU GOT MONEY TO BUY MORE.: NEVER TRUE

## 2024-04-04 SDOH — ECONOMIC STABILITY: FOOD INSECURITY: WITHIN THE PAST 12 MONTHS, THE FOOD YOU BOUGHT JUST DIDN'T LAST AND YOU DIDN'T HAVE MONEY TO GET MORE.: NEVER TRUE

## 2024-04-04 SDOH — ECONOMIC STABILITY: INCOME INSECURITY: HOW HARD IS IT FOR YOU TO PAY FOR THE VERY BASICS LIKE FOOD, HOUSING, MEDICAL CARE, AND HEATING?: NOT HARD AT ALL

## 2024-04-04 ASSESSMENT — PATIENT HEALTH QUESTIONNAIRE - PHQ9
SUM OF ALL RESPONSES TO PHQ QUESTIONS 1-9: 0
SUM OF ALL RESPONSES TO PHQ QUESTIONS 1-9: 0
1. LITTLE INTEREST OR PLEASURE IN DOING THINGS: NOT AT ALL
SUM OF ALL RESPONSES TO PHQ QUESTIONS 1-9: 0
2. FEELING DOWN, DEPRESSED OR HOPELESS: NOT AT ALL
SUM OF ALL RESPONSES TO PHQ QUESTIONS 1-9: 0
SUM OF ALL RESPONSES TO PHQ9 QUESTIONS 1 & 2: 0

## 2024-04-04 NOTE — PROGRESS NOTES
Chief Complaint   Other (Infected lip, started Saturday draining he thinks it started as a ingrown hair.)    History of Present Illness   Source of history provided by:  patient.      Chacorta Vallejo is a 39 y.o. old male presenting to the walk in clinic for evaluation of suspected infection to the lower lip for the past 3 to 4 days.  Patient reports he originally felt a bump on the inside of his lip he was picking at it and it has since become more painful, erythematous, and caused swelling of the lower lip and left side of his face.  Pt  tried antiseptic mouthwash and Neosporin OTC without relief. Pt denies any facial redness, sore throat, fever, chills, HA, ear pain, or recent illness. Pt is able to handle his/her own secretions and drink fluids without difficulty.    ROS    Unless otherwise stated in this report or unable to obtain because of the patient's clinical or mental status as evidenced by the medical record, this patients's positive and negative responses for Review of Systems, constitutional, psych, eyes, ENT, cardiovascular, respiratory, gastrointestinal, neurological, genitourinary, musculoskeletal, integument systems and systems related to the presenting problem are either stated in the preceding or were not pertinent or were negative for the symptoms and/or complaints related to the medical problem.    Physical Exam         VS:  /80   Pulse 97   Temp 98.8 °F (37.1 °C) (Temporal)   Ht 1.829 m (6')   Wt 100.5 kg (221 lb 9.6 oz)   SpO2 99%   BMI 30.05 kg/m²     Constitutional:  Alert, development consistent with age.  HEENT:  NC/NT.  Airway patent.  Eyes PERRL.  Ears with normal bilateral TMs and normal bilateral canals.    Neck:  Supple. Normal ROM.  No adenopathy.    Lips:  No erythema or abrasions noted.    Mouth:  Normal tongue and moist buccal mucosa. Floor of the mouth is soft. No tenderness in the submental or submandibular space. No tongue elevation.  No drooling.  No trismus

## 2024-06-20 ENCOUNTER — OFFICE VISIT (OUTPATIENT)
Dept: FAMILY MEDICINE CLINIC | Age: 39
End: 2024-06-20
Payer: COMMERCIAL

## 2024-06-20 VITALS
SYSTOLIC BLOOD PRESSURE: 128 MMHG | RESPIRATION RATE: 16 BRPM | WEIGHT: 210 LBS | HEIGHT: 72 IN | OXYGEN SATURATION: 97 % | HEART RATE: 93 BPM | BODY MASS INDEX: 28.44 KG/M2 | TEMPERATURE: 98.7 F | DIASTOLIC BLOOD PRESSURE: 78 MMHG

## 2024-06-20 DIAGNOSIS — E55.9 VITAMIN D DEFICIENCY: ICD-10-CM

## 2024-06-20 DIAGNOSIS — R53.82 CHRONIC FATIGUE: ICD-10-CM

## 2024-06-20 DIAGNOSIS — R73.01 IFG (IMPAIRED FASTING GLUCOSE): ICD-10-CM

## 2024-06-20 DIAGNOSIS — E53.8 VITAMIN B 12 DEFICIENCY: ICD-10-CM

## 2024-06-20 DIAGNOSIS — I10 ESSENTIAL HYPERTENSION: ICD-10-CM

## 2024-06-20 DIAGNOSIS — N52.9 ERECTILE DYSFUNCTION, UNSPECIFIED ERECTILE DYSFUNCTION TYPE: ICD-10-CM

## 2024-06-20 DIAGNOSIS — E78.2 MIXED HYPERLIPIDEMIA: ICD-10-CM

## 2024-06-20 DIAGNOSIS — I10 BENIGN ESSENTIAL HTN: Primary | ICD-10-CM

## 2024-06-20 DIAGNOSIS — E03.9 ACQUIRED HYPOTHYROIDISM: ICD-10-CM

## 2024-06-20 PROCEDURE — 3078F DIAST BP <80 MM HG: CPT | Performed by: FAMILY MEDICINE

## 2024-06-20 PROCEDURE — 3074F SYST BP LT 130 MM HG: CPT | Performed by: FAMILY MEDICINE

## 2024-06-20 PROCEDURE — 99214 OFFICE O/P EST MOD 30 MIN: CPT | Performed by: FAMILY MEDICINE

## 2024-06-20 RX ORDER — SILDENAFIL CITRATE 20 MG/1
20 TABLET ORAL PRN
Qty: 30 TABLET | Refills: 5 | Status: SHIPPED | OUTPATIENT
Start: 2024-06-20

## 2025-02-11 ENCOUNTER — HOSPITAL ENCOUNTER (EMERGENCY)
Age: 40
Discharge: HOME | End: 2025-02-11
Attending: FAMILY MEDICINE
Payer: COMMERCIAL

## 2025-02-11 VITALS
DIASTOLIC BLOOD PRESSURE: 80 MMHG | OXYGEN SATURATION: 99 % | RESPIRATION RATE: 16 BRPM | SYSTOLIC BLOOD PRESSURE: 122 MMHG | HEART RATE: 96 BPM | TEMPERATURE: 98.9 F

## 2025-02-11 DIAGNOSIS — K52.9 GASTROENTERITIS: Primary | ICD-10-CM

## 2025-02-11 DIAGNOSIS — Z20.828 EXPOSURE TO INFLUENZA: ICD-10-CM

## 2025-02-11 PROCEDURE — 99283 EMERGENCY DEPT VISIT LOW MDM: CPT

## 2025-02-11 RX ORDER — ONDANSETRON 4 MG/1
4 TABLET, ORALLY DISINTEGRATING ORAL 3 TIMES DAILY PRN
Qty: 5 TABLET | Refills: 0 | Status: SHIPPED | OUTPATIENT
Start: 2025-02-11

## 2025-02-11 RX ORDER — OSELTAMIVIR PHOSPHATE 75 MG/1
75 CAPSULE ORAL 2 TIMES DAILY
Qty: 10 CAPSULE | Refills: 0 | Status: SHIPPED | OUTPATIENT
Start: 2025-02-11 | End: 2025-02-16

## 2025-02-11 ASSESSMENT — PAIN - FUNCTIONAL ASSESSMENT: PAIN_FUNCTIONAL_ASSESSMENT: NONE - DENIES PAIN

## 2025-02-11 NOTE — ED PROVIDER NOTES
HPI:  2/11/25,   Time: 1:59 PM EST         Chacorta Vallejo Jr. is a 39 y.o. male presenting to the ED for nausea and diarrhea, body aches and chills that started last night around 11 AM, and he presents with family members that have similar symptoms starting around the same time.  They have all been exposed to influenza about 3 days ago.  He denies of vomiting.  Denies upper respiratory symptoms.      ROS:   Pertinent positives and negatives are stated within HPI, all other systems reviewed and are negative.  --------------------------------------------- PAST HISTORY ---------------------------------------------  Past Medical History:  has a past medical history of Anxiety, Asthma, Hypertension, and PTSD (post-traumatic stress disorder).    Past Surgical History:  has a past surgical history that includes knee surgery (Left); other surgical history (Left, 10/8/2015); and fracture surgery.    Social History:  reports that he has been smoking cigarettes. He has a 15 pack-year smoking history. He has never used smokeless tobacco. He reports that he does not drink alcohol and does not use drugs.    Family History: family history includes High Blood Pressure in his father.     The patient’s home medications have been reviewed.    Allergies: Ativan [lorazepam]    -------------------------------------------------- RESULTS -------------------------------------------------  All laboratory and radiology results have been personally reviewed by myself   LABS:  No results found for this visit on 02/11/25.    RADIOLOGY:  Interpreted by Radiologist.  No orders to display       ------------------------- NURSING NOTES AND VITALS REVIEWED ---------------------------   The nursing notes within the ED encounter and vital signs as below have been reviewed.   /80   Pulse 96   Temp 98.9 °F (37.2 °C) (Temporal)   Resp 16   SpO2 99%   Oxygen Saturation Interpretation: